# Patient Record
Sex: MALE | Race: WHITE | Employment: FULL TIME | URBAN - METROPOLITAN AREA
[De-identification: names, ages, dates, MRNs, and addresses within clinical notes are randomized per-mention and may not be internally consistent; named-entity substitution may affect disease eponyms.]

---

## 2017-10-25 ENCOUNTER — HOSPITAL ENCOUNTER (OUTPATIENT)
Dept: PHYSICAL THERAPY | Age: 67
Discharge: HOME | End: 2017-10-25
Payer: COMMERCIAL

## 2017-10-25 PROCEDURE — 97112 NEUROMUSCULAR REEDUCATION: CPT

## 2017-10-25 PROCEDURE — 97163 PT EVAL HIGH COMPLEX 45 MIN: CPT

## 2017-10-25 PROCEDURE — 97530 THERAPEUTIC ACTIVITIES: CPT
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
## 2017-11-02 ENCOUNTER — HOSPITAL ENCOUNTER (OUTPATIENT)
Dept: PHYSICAL THERAPY | Age: 67
Discharge: HOME | End: 2017-11-02
Payer: COMMERCIAL

## 2017-11-02 PROCEDURE — 97112 NEUROMUSCULAR REEDUCATION: CPT

## 2017-11-06 NOTE — PROGRESS NOTES
PT DAILY TREATMENT NOTE     Patient Name: Terence Kim  Date:2017  : 1950  [x]  Patient  Verified  Payor: Osmin Russ / Plan: [facility] [employer] PPO / Product Type: PPO /    In time:515  Out time:555  Total Treatment Time (min): 40  Visit #: 2 of 8    Treatment Area: Vestibular dysfunction [H83.2X9]    SUBJECTIVE  Pain Level (0-10 scale): 0  Any medication changes, allergies to medications, adverse drug reactions, diagnosis change, or new procedure performed?: [x] No    [] Yes (see summary sheet for update)  Subjective functional status/changes:   [] No changes reported  Dizziness of about 3/10.  It fluctuates    OBJECTIVE    Modality rationale: Pain control, muscle relaxation   Min Type Additional Details    [] Estim:  []Unatt       []IFC  []Premod                        []Other:  []w/ice   []w/heat  Position:  Location:    [] Estim: []Att    []TENS instruct  []NMES                    []Other:  []w/US   []w/ice   []w/heat  Position:  Location:    []  Traction: [] Cervical       []Lumbar                       [] Prone          []Supine                       []Intermittent   []Continuous Lbs:  [] before manual  [] after manual    []  Ultrasound: []Continuous   [] Pulsed                           []1MHz   []3MHz W/cm2:  Location:    []  Iontophoresis with dexamethasone         Location: [] Take home patch   [] In clinic    []  Ice     []  heat  []  Ice massage  []  Laser   []  Anodyne Position:  Location:    []  Laser with stim  []  Other:  Position:  Location:    []  Vasopneumatic Device Pressure:       [] lo [] med [] hi   Temperature: [] lo [] med [] hi   [] Skin assessment post-treatment:  []intact []redness- no adverse reaction    []redness  adverse reaction:      min []Eval                  []Re-Eval        min Therapeutic Exercise:  [] See flow sheet :        min Therapeutic Activity:  []  See flow sheet :        40 min Neuromuscular Re-education:  [x]  See flow sheet :Advanced VSE, initiated VVI, balance activities, instructed in slelf mobs for CS in sitting position to reduce stiffness   Rationale: improve balance  to improve the patients ability to return to PLOF     min Manual Therapy:                 With   [] TE   [] TA   [] neuro   [] other: Patient Education: [x] Review HEP    [x] Progressed/Changed HEP based on:   [] positioning   [] body mechanics   [] transfers   [] heat/ice application    [] other:      Other Objective/Functional Measures: Right head turn causing increased dizziness and blurred vision     Pain Level (0-10 scale) post treatment: 0    ASSESSMENT/Changes in Function: good tolerance to TE    Patient will continue to benefit from skilled PT services to assess and modify postural abnormalities and address imbalance/dizziness to attain remaining goals. [x]  See Plan of Care  []  See progress note/recertification  []  See Discharge Summary         Progress towards goals / Updated goals:   As per POC    PLAN  []  Upgrade activities as tolerated     [x]  Continue plan of care  []  Update interventions per flow sheet       []  Discharge due to:_  []  Other:_      Yumi Morfin PT 11/5/2017  8:02 PM    Future Appointments  Date Time Provider Arnold Womack   11/8/2017 8:00 AM MARGO Daily [facility]   11/15/2017 8:00 AM MARGO Daily [facility]   11/22/2017 8:00 AM MARGO Hatch [facility]   11/29/2017 8:00 AM MARGO Daily [facility]

## 2017-11-08 ENCOUNTER — HOSPITAL ENCOUNTER (OUTPATIENT)
Dept: PHYSICAL THERAPY | Age: 67
Discharge: HOME | End: 2017-11-08
Payer: COMMERCIAL

## 2017-11-08 PROCEDURE — 97140 MANUAL THERAPY 1/> REGIONS: CPT

## 2017-11-08 PROCEDURE — 97112 NEUROMUSCULAR REEDUCATION: CPT

## 2017-11-08 NOTE — PROGRESS NOTES
PT DAILY TREATMENT NOTE     Patient Name: Cris Ferreira  Date:2017  : 1950  [x]  Patient  Verified  Payor: Grant Frank / Plan: [facility] [employer] PPO / Product Type: PPO /    In time:810  Out time:9  Total Treatment Time (min): 50  Visit #: 3 of 8    Treatment Area: Vestibular dysfunction [H83.2X9]    SUBJECTIVE  Pain Level (0-10 scale): 0  Any medication changes, allergies to medications, adverse drug reactions, diagnosis change, or new procedure performed?: [x] No    [] Yes (see summary sheet for update)  Subjective functional status/changes:   [] No changes reported  Dizziness of about 0/10. It fluctuates. Doing a little bit better. Mornings are good . I feel stronger. I am good until about mid morning. Less driving helps. That neck exercise helped a lot. Started testosterone patch today to improve energy level and cognitive function.  'it helps my hA too'  OBJECTIVE    Modality rationale: Pain control, muscle relaxation   Min Type Additional Details    [] Estim:  []Unatt       []IFC  []Premod                        []Other:  []w/ice   []w/heat  Position:  Location:    [] Estim: []Att    []TENS instruct  []NMES                    []Other:  []w/US   []w/ice   []w/heat  Position:  Location:    []  Traction: [] Cervical       []Lumbar                       [] Prone          []Supine                       []Intermittent   []Continuous Lbs:  [] before manual  [] after manual    []  Ultrasound: []Continuous   [] Pulsed                           []1MHz   []3MHz W/cm2:  Location:    []  Iontophoresis with dexamethasone         Location: [] Take home patch   [] In clinic   10 []  Ice     [x]  heat  []  Ice massage  []  Laser   []  Anodyne Position:supine  Location:CS    []  Laser with stim  []  Other:  Position:  Location:    []  Vasopneumatic Device Pressure:       [] lo [] med [] hi   Temperature: [] lo [] med [] hi   [] Skin assessment post-treatment:  []intact []redness- no adverse reaction    []redness  adverse reaction:      min []Eval                  []Re-Eval        min Therapeutic Exercise:  [] See flow sheet :        min Therapeutic Activity:  []  See flow sheet :        30 min Neuromuscular Re-education:  [x]  See flow sheet :Advanced VSE, initiated VVI, balance activities, initiated [place] ex section A   Rationale: improve balance  to improve the patients ability to return to PLOF    10 min Manual Therapy:  Functional massage CS in supine               With   [] TE   [] TA   [] neuro   [] other: Patient Education: [x] Review HEP    [x] Progressed/Changed HEP based on:   [] positioning   [] body mechanics   [] transfers   [] heat/ice application    [] other:      Other Objective/Functional Measures:   overall reduction in symptoms  Occasional LOB during VSE with MSR position, able to catch himself, mild increase in dizziness, challenged, labored breathing  Needs guidance with coordination of VVI ex  Less CS stiffness and HA  Feeling of unsteadiness after quick head movements     Pain Level (0-10 scale) post treatment: 0    ASSESSMENT/Changes in Function: good tolerance to TE    Patient will continue to benefit from skilled PT services to assess and modify postural abnormalities and address imbalance/dizziness to attain remaining goals. [x]  See Plan of Care  []  See progress note/recertification  []  See Discharge Summary         Progress towards goals / Updated goals:  Short Term Goals: To be accomplished in 4 weeks:                           1. Patient willing to perform HEP 3x daily to assure progress in ambulatory balance and dizziness  Status at Eval: patient eduction initiated  Current status: patient compliant with TID HEP, progressing     2.  Patient reports reduction in balance and dizziness with ADL by >or= 25% for increased tolerance to ADL  Status at [facility]: dizziness and imbalance with ADL, marked fatigue, difficulty with prolonged driving/computer work     Long Term Goals: To be accomplished in 8 weeks:                           1. Improved FOTO score to >or= 74/100 as evidence of improved functional tolerances with ADL, prolonged driving and computer work  Status at [facility]: [address] [facility]     2. Reduction in dizziness with ADL as evidence by improved DHI score to <or=20 points  Status at [facility]: [facility] [address] 34 points     3. Improved FGA to >or= 28/30 points as evidence of improved ambulatory balance  Status at [facility]: FGA 26/30     4.  Patient is willing to continue with vestibular ex and has good understanding of tapering upon discharge to assure continued progress  Status at [facility]: HEP initiated    PLAN  []  Upgrade activities as tolerated     [x]  Continue plan of care  []  Update interventions per flow sheet       []  Discharge due to:_  []  Other:_      Rosio Cardoso PT 11/8/2017  8:02 PM    Future Appointments  Date Time Provider Arnold Womack   11/15/2017 8:00 AM Jeaneth caldera, MARGO JASSO [facility]   11/22/2017 8:00 AM MARGO Daily [facility]   11/29/2017 8:00 AM Jeaneth caldera, MARGO JASSO [facility]

## 2017-11-15 ENCOUNTER — HOSPITAL ENCOUNTER (OUTPATIENT)
Dept: PHYSICAL THERAPY | Age: 67
Discharge: HOME | End: 2017-11-15
Payer: COMMERCIAL

## 2017-11-15 PROCEDURE — 97140 MANUAL THERAPY 1/> REGIONS: CPT

## 2017-11-15 PROCEDURE — 97112 NEUROMUSCULAR REEDUCATION: CPT

## 2017-11-15 NOTE — PROGRESS NOTES
PT DAILY TREATMENT NOTE     Patient Name: Marivel Reynolds  Date:11/15/2017  : 1950  [x]  Patient  Verified  Payor: Melany Estimable / Plan: [facility] [employer] PPO / Product Type: PPO /    In time:805  Out time:9  Total Treatment Time (min): 55  Visit #: 4 of 8    Treatment Area: Vestibular dysfunction [H83.2X9]    SUBJECTIVE  Pain Level (0-10 scale): 0/10  Any medication changes, allergies to medications, adverse drug reactions, diagnosis change, or new procedure performed?: [x] No    [] Yes (see summary sheet for update)  Subjective functional status/changes:   [] No changes reported  I am feeling a little better  OBJECTIVE    Modality rationale: Pain control, muscle relaxation   Min Type Additional Details    [] Estim:  []Unatt       []IFC  []Premod                        []Other:  []w/ice   []w/heat  Position:  Location:    [] Estim: []Att    []TENS instruct  []NMES                    []Other:  []w/US   []w/ice   []w/heat  Position:  Location:    []  Traction: [] Cervical       []Lumbar                       [] Prone          []Supine                       []Intermittent   []Continuous Lbs:  [] before manual  [] after manual    []  Ultrasound: []Continuous   [] Pulsed                           []1MHz   []3MHz W/cm2:  Location:    []  Iontophoresis with dexamethasone         Location: [] Take home patch   [] In clinic   10 []  Ice     [x]  heat  []  Ice massage  []  Laser   []  Anodyne Position:supine  Location:CS    []  Laser with stim  []  Other:  Position:  Location:    []  Vasopneumatic Device Pressure:       [] lo [] med [] hi   Temperature: [] lo [] med [] hi   [] Skin assessment post-treatment:  []intact []redness- no adverse reaction    []redness  adverse reaction:      min []Eval                  []Re-Eval        min Therapeutic Exercise:  [] See flow sheet :        min Therapeutic Activity:  []  See flow sheet :        35 min Neuromuscular Re-education:  [x]  See flow sheet :Advanced VSE and VVI, balance activities, Cawthorne ex section B/A   Rationale: improve balance  to improve the patients ability to return to PLOF    10 min Manual Therapy:  Functional massage CS in supine               With   [] TE   [] TA   [] neuro   [] other: Patient Education: [x] Review HEP    [x] Progressed/Changed HEP based on:   [] positioning   [] body mechanics   [] transfers   [] heat/ice application    [] other:      Other Objective/Functional Measures:   Challenged with SR position yuliana during VSE  Needs verbal cues for proper performance with VSE/VVI ex  Mild increase in dizziness, occasional LOB     Pain Level (0-10 scale) post treatment: 0    ASSESSMENT/Changes in Function: good tolerance to TE    Patient will continue to benefit from skilled PT services to assess and modify postural abnormalities and address imbalance/dizziness to attain remaining goals. [x]  See Plan of Care  []  See progress note/recertification  []  See Discharge Summary         Progress towards goals / Updated goals:  Short Term Goals: To be accomplished in 4 weeks:                           1. Patient willing to perform HEP 3x daily to assure progress in ambulatory balance and dizziness  Status at [facility]: patient eduction initiated  Current status: patient compliant with TID HEP, progressing     2. Patient reports reduction in balance and dizziness with ADL by >or= 25% for increased tolerance to ADL  Status at [facility]: dizziness and imbalance with ADL, marked fatigue, difficulty with prolonged driving/computer work  Current status:      Long Term Goals: To be accomplished in 8 weeks:                           1. Improved FOTO score to >or= 74/100 as evidence of improved functional tolerances with ADL, prolonged driving and computer work  Status at [facility]: [address] [place] [address]     2. Reduction in dizziness with ADL as evidence by improved DHI score to <or=20 points  Status at [facility]: [address] [place] [address] 34 points     3.  Improved FGA to >or= 28/30 points as evidence of improved ambulatory balance  Status at [facility]: FGA 26/30     4.  Patient is willing to continue with vestibular ex and has good understanding of tapering upon discharge to assure continued progress  Status at [facility]: HEP initiated    PLAN  []  Upgrade activities as tolerated     [x]  Continue plan of care  []  Update interventions per flow sheet       []  Discharge due to:_  []  Other:_      João Singh, MARGO 11/15/2017  8:02 PM    Future Appointments  Date Time Provider Arnold Womack   11/22/2017 8:00 AM Jeaneth caldera, PT [facility]   11/29/2017 8:00 AM Jeaneth caldera, [facility]GO [facility]

## 2017-11-22 ENCOUNTER — HOSPITAL ENCOUNTER (OUTPATIENT)
Dept: PHYSICAL THERAPY | Age: 67
Discharge: HOME | End: 2017-11-22
Payer: COMMERCIAL

## 2017-11-22 PROCEDURE — 97112 NEUROMUSCULAR REEDUCATION: CPT

## 2017-11-22 PROCEDURE — 97140 MANUAL THERAPY 1/> REGIONS: CPT

## 2017-11-22 NOTE — PROGRESS NOTES
PT DAILY TREATMENT NOTE     Patient Name: Hussain Curry  Date:2017  : 1950  [x]  Patient  Verified  Payor: Marcela Ragsdale / Plan: [facility] [employer] PPO / Product Type: PPO /    In time:805  Out time:855  Total Treatment Time (min):50   Visit #: 5 of 8    Treatment Area: Vestibular dysfunction [H83.2X9]    SUBJECTIVE  Pain Level (0-10 scale): 0  Any medication changes, allergies to medications, adverse drug reactions, diagnosis change, or new procedure performed?: [x] No    [] Yes (see summary sheet for update)  Subjective functional status/changes:   [] No changes reported  Reports increased ease with driving and head turns, no dizziness or HA this AM, less neck pain, overall improvement about 50%    Modality rationale: Pain control, muscle relaxation   Min Type Additional Details    [] Estim:  []Unatt       []IFC  []Premod                        []Other:  []w/ice   []w/heat  Position:  Location:    [] Estim: []Att    []TENS instruct  []NMES                    []Other:  []w/US   []w/ice   []w/heat  Position:  Location:    []  Traction: [] Cervical       []Lumbar                       [] Prone          []Supine                       []Intermittent   []Continuous Lbs:  [] before manual  [] after manual    []  Ultrasound: []Continuous   [] Pulsed                           []1MHz   []3MHz W/cm2:  Location:    []  Iontophoresis with dexamethasone         Location: [] Take home patch   [] In clinic   10 []  Ice     [x]  heat  []  Ice massage  []  Laser   []  Anodyne Position:supine  Location:CS    []  Laser with stim  []  Other:  Position:  Location:    []  Vasopneumatic Device Pressure:       [] lo [] med [] hi   Temperature: [] lo [] med [] hi   [] Skin assessment post-treatment:  []intact []redness- no adverse reaction    []redness  adverse reaction:      min []Eval                  []Re-Eval        min Therapeutic Exercise:  [] See flow sheet :        min Therapeutic Activity:  []  See flow sheet :        30 min Neuromuscular Re-education:  [x]  See flow sheet :Advanced VSE, initiated VVI, balance activities, initiated Cawthorne ex section c   Rationale: improve balance  to improve the patients ability to return to PLOF    10 min Manual Therapy:  Functional massage CS in supine               With   [] TE   [] TA   [] neuro   [] other: Patient Education: [x] Review HEP    [x] Progressed/Changed HEP based on:   [] positioning   [] body mechanics   [] transfers   [] heat/ice application    [] other:      Other Objective/Functional Measures:   overall reduction in symptoms by 50%  Occasional LOB during VSE with MSR position, able to catch himself, challenged with SR/needs to offset minimally  Difficulty maintaining gaze stability with SR stance, doing better with change in foot position       Pain Level (0-10 scale) post treatment: 0    ASSESSMENT/Changes in Function: good tolerance to TE    Patient will continue to benefit from skilled PT services to assess and modify postural abnormalities and address imbalance/dizziness to attain remaining goals. [x]  See Plan of Care  [x]  See progress note/recertification  []  See Discharge Summary         Progress towards goals / Updated goals:Mr. Coleen Becerril has been showing nice improvement in functional tolerances (FOTO 73/100), reports of dizziness (50% reduction) , ambulatory balance and functional CS mobility. He has met 2 of 6 goals. I recommend continuation of current treatment to address remaining goals. Short Term Goals: To be accomplished in 4 weeks:                           1. Patient willing to perform HEP 3x daily to assure progress in ambulatory balance and dizziness  Status at [facility]: patient eduction initiated  Current status: patient compliant with TID HEP, excellent motivation, goal met     2.  Patient reports reduction in balance and dizziness with ADL by >or= 25% for increased tolerance to ADL  Status at [facility]: dizziness and imbalance with ADL, marked fatigue, difficulty with prolonged driving/computer work  Current status: reduction in fatigue and dizziness by 50%, goal met     Long Term Goals: To be accomplished in 8 weeks:                           1. Improved FOTO score to >or= 74/100 as evidence of improved functional tolerances with ADL, prolonged driving and computer work  Status at [facility]: FOTO 63/100  Current status: FOTO 73/100, goal almost met     2. Reduction in dizziness with ADL as evidence by improved DHI score to <or=20 points  Status at [facility]: [address] [place] [address] 34 points  Current status: DHI 48, score regressed, score doesn't match subjective and demonstrated improvement, will be retested     3. Improved FGA to >or= 28/30 points as evidence of improved ambulatory balance  Status at [facility]: FGA 26/30  Current status: not retested     4.  Patient is willing to continue with vestibular ex and has good understanding of tapering upon discharge to assure continued progress  Status at [facility]: HEP initiated    PLAN  []  Upgrade activities as tolerated     [x]  Continue plan of care  []  Update interventions per flow sheet       []  Discharge due to:_  []  Other:_      Gianfranco Stanley, PT 11/22/2017  8:02 PM    Future Appointments  Date Time Provider Arnold Womack   11/29/2017 8:00 AM Jeaneth caldera, PT [facility]

## 2017-11-28 NOTE — PROGRESS NOTES
[facility] in [address] [place], [address] [place]  Phone: [phone]      Fax:  [phone]    Progress Note  Patient name: Vinicio Robins Start of Care: 10/25/2017   Referral source: Kentrell Taylor MD : 1950                            Medical Diagnosis: Vestibular dysfunction [H83.2X9] Onset XLSE:[id number]                            Treatment Diagnosis: Dizziness, imbalance   Prior Hospitalization: see medical history Provider#: [id number]   Medications: Verified on Patient summary List    Comorbidities: depression, high BP   Prior Level of Function: Full function      Visits from Start of Care: 5    Missed Visits: 0        Progress towards goals / Updated goals:Mr. Sarah Lind has been showing nice improvement in functional tolerances (FOTO 73/100), reports of dizziness (50% reduction) , ambulatory balance and functional CS mobility. He has met 2 of 6 goals. I recommend continuation of current treatment to address remaining goals. Short Term Goals: To be accomplished in 4 weeks:                           1. Patient willing to perform HEP 3x daily to assure progress in ambulatory balance and dizziness  Status at [facility]: patient eduction initiated  Current status: patient compliant with TID HEP, excellent motivation, goal met     2. Patient reports reduction in balance and dizziness with ADL by >or= 25% for increased tolerance to ADL  Status at [facility]: dizziness and imbalance with ADL, marked fatigue, difficulty with prolonged driving/computer work  Current status: reduction in fatigue and dizziness by 50%, goal met     Long Term Goals:  To be accomplished in 8 weeks:                           1. Improved FOTO score to >or= 74/100 as evidence of improved functional tolerances with ADL, prolonged driving and computer work  Status at [facility]: FOTO 63/100  Current status: FOTO 73/100, goal almost met     2. Reduction in dizziness with ADL as evidence by improved DHI score to <or=20 points  Status at [facility]: [facility] [address] 34 points  Current status: DHI 48, score regressed, score doesn't match subjective and demonstrated improvement, will be retested     3. Improved FGA to >or= 28/30 points as evidence of improved ambulatory balance  Status at [facility]: FGA 26/30  Current status: not retested     4. Patient is willing to continue with vestibular ex and has good understanding of tapering upon discharge to assure continued progress  Status at [facility]: HEP initiated  Key Functional Changes: Tolerance to ADL, dizziness    ASSESSMENT/RECOMMENDATIONS:  [x]Continue therapy per initial plan/protocol at a frequency of  1 x per week for 8 weeks ( 3 remaining visits)  []Continue therapy with the following recommended changes:_____________________      _____________________________________________________________________  []Discontinue therapy progressing towards or have reached established goals  []Discontinue therapy due to lack of appreciable progress towards goals  []Discontinue therapy due to lack of attendance or compliance  []Await Physician's recommendations/decisions regarding therapy  []Other:________________________________________________________________    Thank you for this referral.   Linsey Elise, PT 11/27/2017 10:32 PM  NOTE TO PHYSICIAN:  PLEASE COMPLETE THE ORDERS BELOW AND   FAX TO [facility]: [phone]  If you are unable to process this request in 24 hours please contact our office:   [phone]  []  I have read the above report and request that my patient continue as recommended. []  I have read the above report and request that my patient continue therapy with the following changes/special instructions:________________________________________  []I have read the above report and request that my patient be discharged from therapy.     Physicians signature: ______________________________Date: ______Time:______

## 2017-11-29 ENCOUNTER — HOSPITAL ENCOUNTER (OUTPATIENT)
Dept: PHYSICAL THERAPY | Age: 67
Discharge: HOME | End: 2017-11-29
Payer: COMMERCIAL

## 2017-11-29 PROCEDURE — 97110 THERAPEUTIC EXERCISES: CPT

## 2017-11-29 PROCEDURE — 97112 NEUROMUSCULAR REEDUCATION: CPT

## 2017-11-29 NOTE — PROGRESS NOTES
PT DAILY TREATMENT NOTE     Patient Name: Jacque Garcia  Date:2017  : 1950  [x]  Patient  Verified  Payor: Devonte Espino / Plan: [facility] [employer] PPO / Product Type: PPO /    In time:805  Out time:855  Total Treatment Time (min):50   Visit #: 6 of 8    Treatment Area: Vestibular dysfunction [H83.2X9]    SUBJECTIVE  Pain Level (0-10 scale): 0, dizziness 1-2  Any medication changes, allergies to medications, adverse drug reactions, diagnosis change, or new procedure performed?: [x] No    [] Yes (see summary sheet for update)  Subjective functional status/changes:   [] No changes reported  Reports increased ease with driving and head turns, no dizziness or HA this AM, less neck pain, overall improvement about 50%  Overall feeling better , increased attention span, have not had any falls or LOB over the last 3 weeks. 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 It is most difficulty if I have to focus on something while there is a lot of noise and I have to balance/walk /negotiate obstacles.   It seems like my dizziness level is related to my BP    Modality rationale: Pain control, muscle relaxation   Min Type Additional Details    [] Estim:  []Unatt       []IFC  []Premod                        []Other:  []w/ice   []w/heat  Position:  Location:    [] Estim: []Att    []TENS instruct  []NMES                    []Other:  []w/US   []w/ice   []w/heat  Position:  Location:    []  Traction: [] Cervical       []Lumbar                       [] Prone          []Supine                       []Intermittent   []Continuous Lbs:  [] before manual  [] after manual    []  Ultrasound: []Continuous   [] Pulsed                           []1MHz   []3MHz W/cm2:  Location:    []  Iontophoresis with dexamethasone         Location: [] Take home patch   [] In clinic   10 []  Ice     [x]  heat  []  Ice massage  []  Laser   []  Anodyne Position:supine  Location: []  Laser with stim  []  Other:  Position:  Location:    []  Vasopneumatic Device Pressure:       [] lo [] med [] hi   Temperature: [] lo [] med [] hi   [] Skin assessment post-treatment:  []intact []redness- no adverse reaction    []redness  adverse reaction:      min []Eval                  []Re-Eval       10 min Therapeutic Exercise:  [x] See flow sheet :CS ROM and muscle release with NH in supine        min Therapeutic Activity:  []  See flow sheet :        30 min Neuromuscular Re-education:  [x]  See flow sheet :Advanced VSE, initiated VVI, balance activities, initiated Cawthorne ex section c   Rationale: improve balance  to improve the patients ability to return to PLOF     min Manual Therapy:                 With   [] TE   [] TA   [] neuro   [] other: Patient Education: [x] Review HEP    [x] Progressed/Changed HEP based on:   [] positioning   [] body mechanics   [] transfers   [] heat/ice application    [] other:      Other Objective/Functional Measures:   overall reduction in symptoms by 50%  Increased sway with horizontal head turns  Difficulty with tandem gait without looking down  Challenged with Bosu, needs external assist to maintain stance  Increased dizziness to 3/10 post vestibular/gait activities  Mild CS pain upper CS with left rotation  Instructed in self massage/mobs       Pain Level (0-10 scale) post treatment: 0    ASSESSMENT/Changes in Function: good tolerance to TE    Patient will continue to benefit from skilled PT services to assess and modify postural abnormalities and address imbalance/dizziness to attain remaining goals. [x]  See Plan of Care  [x]  See progress note/recertification  []  See Discharge Summary         Progress towards goals / Updated goals:Mr. Олег Becerra has been showing nice improvement in functional tolerances (FOTO 73/100), reports of dizziness (50% reduction) , ambulatory balance and functional CS mobility. He has met 2 of 6 goals.  I recommend continuation of current treatment to address remaining goals. Short Term Goals: To be accomplished in 4 weeks:                           1. Patient willing to perform HEP 3x daily to assure progress in ambulatory balance and dizziness  Status at [facility]: patient eduction initiated  Current status: patient compliant with TID HEP, excellent motivation, goal met     2. Patient reports reduction in balance and dizziness with ADL by >or= 25% for increased tolerance to ADL  Status at [facility]: dizziness and imbalance with ADL, marked fatigue, difficulty with prolonged driving/computer work  Current status: reduction in fatigue and dizziness by 50%, goal met     Long Term Goals: To be accomplished in 8 weeks:                           1. Improved FOTO score to >or= 74/100 as evidence of improved functional tolerances with ADL, prolonged driving and computer work  Status at [facility]: FOTO 63/100  Current status: FOTO 73/100, goal almost met     2. Reduction in dizziness with ADL as evidence by improved DHI score to <or=20 points  Status at [facility]: [address] [place] [address] 34 points  Current status: DHI 48, score regressed, score doesn't match subjective and demonstrated improvement, will be retested     3. Improved FGA to >or= 28/30 points as evidence of improved ambulatory balance  Status at [facility]: FGA 26/30  Current status: not retested     4. Patient is willing to continue with vestibular ex and has good understanding of tapering upon discharge to assure continued progress  Status at [facility]: HEP initiated    PLAN  []  Upgrade activities as tolerated     [x]  Continue plan of care  []  Update interventions per flow sheet       []  Discharge due to:_  []  Other:_      Oralia Murphy, PT 11/29/2017  8:02 PM    No future appointments.

## 2017-12-07 ENCOUNTER — HOSPITAL ENCOUNTER (OUTPATIENT)
Dept: PHYSICAL THERAPY | Age: 67
Discharge: HOME | End: 2017-12-07
Payer: MEDICARE

## 2017-12-07 PROCEDURE — 97110 THERAPEUTIC EXERCISES: CPT

## 2017-12-07 PROCEDURE — 97140 MANUAL THERAPY 1/> REGIONS: CPT

## 2017-12-07 PROCEDURE — 97112 NEUROMUSCULAR REEDUCATION: CPT

## 2017-12-07 NOTE — PROGRESS NOTES
PT DAILY TREATMENT NOTE     Patient Name: Angela Richards  Date:2017  : 1950  [x]  Patient  Verified  Payor: [place] MEDICARE / Plan: [place] MEDICARE PART A & B / Product Type: Medicare /    In time:438  Out time:525  Total Treatment Time (min):47   Visit #: 7 of 8    Treatment Area: Vestibular dysfunction [H83.2X9]    SUBJECTIVE  Pain Level (0-10 scale): 2/10 CS pain  Any medication changes, allergies to medications, adverse drug reactions, diagnosis change, or new procedure performed?: [x] No    [] Yes (see summary sheet for update)  Subjective functional status/changes:   [] No changes reported  I have been feeling pretty good even that I worked all day    Modality rationale: Pain control, muscle relaxation   Min Type Additional Details    [] Estim:  []Unatt       []IFC  []Premod                        []Other:  []w/ice   []w/heat  Position:  Location:    [] Estim: []Att    []TENS instruct  []NMES                    []Other:  []w/US   []w/ice   []w/heat  Position:  Location:    []  Traction: [] Cervical       []Lumbar                       [] Prone          []Supine                       []Intermittent   []Continuous Lbs:  [] before manual  [] after manual    []  Ultrasound: []Continuous   [] Pulsed                           []1MHz   []3MHz W/cm2:  Location:    []  Iontophoresis with dexamethasone         Location: [] Take home patch   [] In clinic   10 []  Ice     [x]  heat  []  Ice massage  []  Laser   []  Anodyne Position:supine  Location:CS    []  Laser with stim  []  Other:  Position:  Location:    []  Vasopneumatic Device Pressure:       [] lo [] med [] hi   Temperature: [] lo [] med [] hi   [] Skin assessment post-treatment:  []intact []redness- no adverse reaction    []redness  adverse reaction:      min []Eval                  []Re-Eval       12 min Therapeutic Exercise:  [x] See flow sheet :CS ROM and muscle release with TN in supine        min Therapeutic Activity:  []  See flow sheet : 15 min Neuromuscular Re-education:  [x]  See flow sheet :Advanced VSE, initiated VVI, balance activities, [place] Ex   Rationale: improve balance  to improve the patients ability to return to PLOF    10 min Manual Therapy: functional massage CS                With   [] TE   [] TA   [] neuro   [] other: Patient Education: [x] Review HEP    [x] Progressed/Changed HEP based on:   [] positioning   [] body mechanics   [] transfers   [] heat/ice application    [] other:      Other Objective/Functional Measures:   overall reduction in symptoms by 40-50%  Difficulty and occasional LOB with VSE in SR, standing on airex mat  Mild left suboccipital pain with CS Rot to left         Pain Level (0-10 scale) post treatment: 0    ASSESSMENT/Changes in Function: good tolerance to TE    Patient will continue to benefit from skilled PT services to assess and modify postural abnormalities and address imbalance/dizziness to attain remaining goals. [x]  See Plan of Care  [x]  See progress note/recertification  []  See Discharge Summary         Progress towards goals / Updated goals:Mr. Coleen Becerril has been showing nice improvement in functional tolerances (FOTO 73/100), reports of dizziness (50% reduction) , ambulatory balance and functional CS mobility. He has met 2 of 6 goals. I recommend continuation of current treatment to address remaining goals. Short Term Goals: To be accomplished in 4 weeks:                           1. Patient willing to perform HEP 3x daily to assure progress in ambulatory balance and dizziness  Status at Eval: patient eduction initiated  Current status: patient compliant with TID HEP, excellent motivation, goal met     2.  Patient reports reduction in balance and dizziness with ADL by >or= 25% for increased tolerance to ADL  Status at HEP: dizziness and imbalance with ADL, marked fatigue, difficulty with prolonged driving/computer work  Current status: reduction in fatigue and dizziness by 50%, goal met     Long Term Goals: To be accomplished in 8 weeks:                           1. Improved FOTO score to >or= 74/100 as evidence of improved functional tolerances with ADL, prolonged driving and computer work  Status at [facility]: FOTO 63/100  Current status: FOTO 73/100, goal almost met     2. Reduction in dizziness with ADL as evidence by improved DHI score to <or=20 points  Status at [facility]: [address] [place] [address] 34 points  Current status: DHI 48, score regressed, score doesn't match subjective and demonstrated improvement, will be retested     3. Improved FGA to >or= 28/30 points as evidence of improved ambulatory balance  Status at [facility]: FGA 26/30  Current status: not retested     4.  Patient is willing to continue with vestibular ex and has good understanding of tapering upon discharge to assure continued progress  Status at [facility]: HEP initiated    PLAN  []  Upgrade activities as tolerated     [x]  Continue plan of care, reevaluation NV  []  Update interventions per flow sheet       []  Discharge due to:_  []  Other:_      Darinel Mike PT 12/7/2017  8:02 PM    Future Appointments  Date Time Provider Arnold Womack   12/14/2017 4:30 PM Darinel Mike, PT [facility]PTD [facility]

## 2017-12-13 ENCOUNTER — APPOINTMENT (OUTPATIENT)
Dept: PHYSICAL THERAPY | Age: 67
End: 2017-12-13
Payer: MEDICARE

## 2017-12-14 ENCOUNTER — HOSPITAL ENCOUNTER (OUTPATIENT)
Dept: PHYSICAL THERAPY | Age: 67
Discharge: HOME | End: 2017-12-14
Payer: MEDICARE

## 2017-12-14 PROCEDURE — 97112 NEUROMUSCULAR REEDUCATION: CPT

## 2017-12-14 PROCEDURE — 97530 THERAPEUTIC ACTIVITIES: CPT

## 2017-12-14 NOTE — PROGRESS NOTES
PT DAILY TREATMENT NOTE     Patient Name: Juanita Milner  Date:2017  : 1950  [x]  Patient  Verified  Payor: Rajesh Betancourt / Plan: [place] MEDICARE PART A & B / Product Type: Medicare /    In time:440  Out time:520  Total Treatment Time (min):40   Visit #: 8 of 8    Treatment Area: Vestibular dysfunction [H83.2X9]    SUBJECTIVE  Pain Level (0-10 scale): 0/10 CS pain  Any medication changes, allergies to medications, adverse drug reactions, diagnosis change, or new procedure performed?: [x] No    [] Yes (see summary sheet for update)  Subjective functional status/changes:   [] No changes reported  I saw the dentist this morning and that set me off for an hour but overall I am feeling much better  Overall improvement of about 50%  Residual mild fatigue with prolonged driving and computer work         min []Eval                  []Re-Eval           25 min Therapeutic Activity:  [x]  See flow sheet :reevaluation of current status, balance activities, advanced HEP        15 min Neuromuscular Re-education:  [x]  See flow sheet :instruction in advanced VSE/VVI and tapering after d/c   Rationale: improve balance  to improve the patients ability to return to PLOF                With   [] TE   [] TA   [] neuro   [] other: Patient Education: [x] Review HEP    [x] Progressed/Changed HEP based on:   [] positioning   [] body mechanics   [] transfers   [] heat/ice application    [] other:      Other Objective/Functional Measures:   overall reduction in symptoms by 50%  FOTO regressed to 66/100, patient has answered lots of the questions more related to his residual cognitive issues  DHI regressed to 60 for same reason as above  Good understanding of HEP and tapering  Will continue with Neurooptometrist  Stacy hallpike test negative           Pain Level (0-10 scale) post treatment: 0      [x]  See Discharge Summary         Progress towards goals / Updated goals:Mr. Syl Valladares has been showing nice improvement in functional tolerances , reports of dizziness (50% reduction) , ambulatory balance (FGA 30/30) and functional CS mobility. He has met 4 of 6 goals. Unmet goals are related to fluctuation in FOTO /DHI scores. I recommend discharge to HEP. Short Term Goals: To be accomplished in 4 weeks:                           1. Patient willing to perform HEP 3x daily to assure progress in ambulatory balance and dizziness  Status at [facility]: patient eduction initiated  Current status: patient compliant with TID HEP, excellent motivation, goal met     2. Patient reports reduction in balance and dizziness with ADL by >or= 25% for increased tolerance to ADL  Status at [facility]: dizziness and imbalance with ADL, marked fatigue, difficulty with prolonged driving/computer work  Current status: reduction in fatigue and dizziness by 50%, goal met     Long Term Goals: To be accomplished in 8 weeks:                           1. Improved FOTO score to >or= 74/100 as evidence of improved functional tolerances with ADL, prolonged driving and computer work  Status at [facility]: FOTO 63/100  Current status: FOTO 73/100, goal almost met  Status at [facility]:66/100, slight regression since last tested     2. Reduction in dizziness with ADL as evidence by improved DHI score to <or=20 points  Status at [facility]: [address] [place] [address] 34 points  Current status: DHI 48, score regressed, score doesn't match subjective and demonstrated improvement, will be retested  Status at /: further regression to 66/100 due to patient relating most questions to cognitive deficits and vision rather than dizziness, overall dizziness reduced by 50% subjectively, goal not met     3. Improved FGA to >or= 28/30 points as evidence of improved ambulatory balance  Status at [facility]: FGA 26/30  Current status:FGA 30/30, goal met     4.  Patient is willing to continue with vestibular ex and has good understanding of tapering upon discharge to assure continued progress  Status at [facility]: HEP initiated  Status at /: goal met    PLAN       [x]  Discharge due to:_  []  Other:_      Dane Romero, PT 12/14/2017  8:02 PM    No future appointments.

## 2017-12-15 NOTE — PROGRESS NOTES
[facility] in [address] [place], [address] [place]  Phone: [phone]      Fax:  [phone]    Discharge Summary      Patient name: Cruz Salinas Start of Care: 10/25/2017   Referral source: Melia Stover MD : 1950                            Medical Diagnosis: Vestibular dysfunction [H83.2X9] Onset Date:                            Treatment Diagnosis: Dizziness, imbalance   Prior Hospitalization: see medical history Provider#: [id number]   Medications: Verified on Patient summary List    Comorbidities: depression, high BP   Prior Level of Function: Full function      Visits from Start of Care: 8    Missed Visits: 0  Reporting Period : 10/25/17 to 17      Progress towards goals / Updated goals:Mr. Gilberto Mares has been showing nice improvement in functional tolerances , reports of dizziness (50% reduction) , ambulatory balance (FGA 30/30) and functional CS mobility. He has met 4 of 6 goals. Unmet goals are related to fluctuation in FOTO /DHI scores. I recommend discharge to [facility]. Short Term Goals: To be accomplished in 4 weeks:                           1. Patient willing to perform HEP 3x daily to assure progress in ambulatory balance and dizziness  Status at [facility]: patient eduction initiated  Current status: patient compliant with TID HEP, excellent motivation, goal met     2. Patient reports reduction in balance and dizziness with ADL by >or= 25% for increased tolerance to ADL  Status at [facility]: dizziness and imbalance with ADL, marked fatigue, difficulty with prolonged driving/computer work  Current status: reduction in fatigue and dizziness by 50%, goal met     Long Term Goals:  To be accomplished in 8 weeks:                           1. Improved FOTO score to >or= 74/100 as evidence of improved functional tolerances with ADL, prolonged driving and computer work  Status at [facility]: FOTO 63/100  Current status: FOTO 73/100, goal almost met  Status at D/C:66/100, slight regression since last tested     2. Reduction in dizziness with ADL as evidence by improved DHI score to <or=20 points  Status at [facility]: [address] 34 points  Current status: DHI 48, score regressed, score doesn't match subjective and demonstrated improvement, will be retested  Status at D/C: further regression to 66/100 due to patient relating most questions to cognitive deficits and vision rather than dizziness, overall dizziness reduced by 50% subjectively, goal not met     3. Improved FGA to >or= 28/30 points as evidence of improved ambulatory balance  Status at [facility]: FGA 26/30  Current status:FGA 30/30, goal met     4. Patient is willing to continue with vestibular ex and has good understanding of tapering upon discharge to assure continued progress  Status at [facility]: HEP initiated  Status at D/C: goal met    G-Codes (GP)  Mobility    Goal  CJ= 20-39%  D/C  CJ= 20-39%      The severity rating is based on clinical judgment and the FOTO score. Assessment/ Summary of Care: Excellent progress.  Independent with progression and tapering of vestibular ex    RECOMMENDATIONS:  [x]Discontinue therapy: [x]Patient has reached or is progressing toward set goals      []Patient is non-compliant or has abdicated      []Due to lack of appreciable progress towards set goals    Darinel Mike, PT 12/15/2017 8:35 AM

## 2023-02-15 ENCOUNTER — HOSPITAL ENCOUNTER (OUTPATIENT)
Facility: HOSPITAL | Age: 73
Setting detail: RECURRING SERIES
Discharge: HOME | End: 2023-02-18
Payer: MEDICARE

## 2023-02-15 PROCEDURE — 97110 THERAPEUTIC EXERCISES: CPT

## 2023-02-15 PROCEDURE — 97161 PT EVAL LOW COMPLEX 20 MIN: CPT

## 2023-02-15 NOTE — PROGRESS NOTES
[facility] at [address]  Phone: [phone]   Fax: [phone]    Plan of Care/ Statement of Necessity for Physical Therapy Services      Patient name: Oz Rodriguez Start of Care: 2/15/2023   Referral source: Whitney Balderrama : 1950    Medical Diagnosis: No admission diagnoses are documented for this encounter. 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 Assessment/ key information: Patient is a 80 yo male that presents with c/o low back pain that radiates zaria bilateral LEs for the last 15 years and becoming progressively worse. He has decreased postural awareness. He demonstrates reduced lumbar ROM. He has decreased bilateral LE and abdominal strength. He has reduced balance and requires UEA to perform SLS. Patient ambulates with independence demonstrating a antalgic gait pattern and a WBOS. Patient's demonstrates a flexion based directional preference. Patient presentation is consistent with spinal stenosis accompanied by bilateral LE neuropathy. Patient will benefit from skilled PT services to modify and progress therapeutic interventions, address functional mobility deficits, address ROM deficits, address strength deficits, analyze and address soft tissue restrictions, analyze and cue movement patterns, analyze and modify body mechanics/ergonomics and assess and modify postural abnormalities to attain his goals.     Evaluation Complexity HistoryHIGH Complexity :3+ comorbidities / personal factors will impact the outcome/ POC  ; Examination LOW Complexity : 1-2 Standardized tests and measures addressing body structure, function, activity limitation and / or participation in recreation  ;Presentation LOW Complexity : Stable, uncomplicated  ;Clinical Decision Making MEDIUM Complexity : FOTO score of 26-74 FOTO score = an established functional score where 100 = no disability  Overall Complexity Rating: LOW   Problem List: pain affecting function, decrease ROM, decrease strength, impaired gait/balance, decrease ADL/functional abilities, decrease activity tolerance, decrease flexibility/joint mobility, and decrease transfer abilities    Treatment Plan may include any combination of the followin Therapeutic Exercise, 41684 Neuromuscular Re-Education, 31720 Manual Therapy, 33964 Therapeutic Activity, 30451 Self Care/Home Management, 95590 Aquatic Therapy, and 39694 Gait Training  Patient / Family readiness to learn indicated by: asking questions, trying to perform skills, interest, return verbalization , and return demonstration   Persons(s) to be included in education: patient (P)  Barriers to Learning/Limitations: None  Measures taken if barriers to learning present: NA  Patient Goal (s): To be able to walk and run further  Patient Self Reported Health Status: good  Rehabilitation Potential: good    Short Term Goals: To be accomplished in 4 weeks:   Patient will report compliance with HEP 1x/day to aid in rehabilitation program.   Status at IE: Provided initial HEP   Current:Same as IE     Long Term Goals: To be accomplished in 8 weeks:   Patient will increase bilateral LE strength to 5/5 MMT throughout to aid in completion of ADLs. Status at IE:4/5   Current:Same as IE     Patient will report pain no greater than 0-2/10 throughout entire day to aid in completion of ADLs.    Status at IE:1-610   Current:Same as IE     Patent will be able to stand/walk for 30 mins tor greater with pain 1-2/10 to be able to ambulate community distances and complete ADLs.   Status at IE:15 minutes   Current:Same as IE     Patient will improve FOTO score to 58 points to demonstrate improvement in functional status. Status at IE:FOTO score = 50 (an established functional score where 100 = no disability)   Current: Same as IE    Frequency / Duration: Patient to be seen 2 times per week for 8 weeks    Patient/ Caregiver education and instruction: Diagnosis, prognosis, self care, activity modification, and exercises     [x]  Plan of care has been reviewed with PTA    Certification Period: 2/15/2023 - 5/16/2023    Austin Wilkins, PT 2/15/2023 3:18 PM  _____________________________________________________________________  I certify that the above Therapy Services are being furnished while the patient is under my care. I agree with the treatment plan and certify that this therapy is necessary.     Physician's Signature:____________Date:_________TIME:________                              Mar Mckeon PA-C    ** Signature, Date and Time must be completed for valid certification **    [facility] at [address]  Phone: [phone]   Fax: [phone]

## 2023-02-17 ENCOUNTER — HOSPITAL ENCOUNTER (OUTPATIENT)
Facility: HOSPITAL | Age: 73
Setting detail: RECURRING SERIES
Discharge: HOME | End: 2023-02-20
Payer: MEDICARE

## 2023-02-17 PROCEDURE — 97110 THERAPEUTIC EXERCISES: CPT

## 2023-02-17 PROCEDURE — 97112 NEUROMUSCULAR REEDUCATION: CPT

## 2023-02-17 PROCEDURE — 97530 THERAPEUTIC ACTIVITIES: CPT

## 2023-02-17 NOTE — PROGRESS NOTES
PHYSICAL / OCCUPATIONAL THERAPY - DAILY TREATMENT NOTE (updated )    Patient Name: Marga Conner    Date: 2023    : 1950  Insurance: Payor: MEDICARE / Plan: MEDICARE PART A AND B / Product Type: *No Product type* /      Patient  verified Yes     Visit #   Current / Total 2 16   Time   In / Out 1300 1338   Pain   In / Out 0 0   Subjective Functional Status/Changes: Patient reports that he received an epidural ejection yesterday and he has no pain today. Changes to:  Meds, Allergies, Med Hx, Sx Hx? If yes, update Summary List no       TREATMENT AREA =  No admission diagnoses are documented for this encounter. OBJECTIVE    Therapeutic Procedures: Tx Min Billable or 1:1 Min (if diff from Tx Min) Procedure, Rationale, Specifics   20  89052 Therapeutic Exercise (timed):  increase ROM, strength, coordination, balance, and proprioception to improve patient's ability to progress to PLOF and address remaining functional goals. (see flow sheet as applicable)     Details if applicable:       10  25619 Therapeutic Activity (timed):  use of dynamic activities replicating functional movements to increase ROM, strength, coordination, balance, and proprioception in order to improve patient's ability to progress to PLOF and address remaining functional goals. (see flow sheet as applicable)     Details if applicable:     8  38779 Neuromuscular Re-Education (timed):  improve balance, coordination, kinesthetic sense, posture, core stability and proprioception to improve patient's ability to develop conscious control of individual muscles and awareness of position of extremities in order to progress to PLOF and address remaining functional goals.  (see flow sheet as applicable)     Details if applicable:     45  [facility] Totals Reminder: bill using total billable min of TIMED therapeutic procedures (example: do not include dry needle or estim unattended, both untimed codes, in totals to left)  8-22 min = 1 unit; 23-37 min = 2 units; 38-52 min = 3 units; 53-67 min = 4 units; 68-82 min = 5 units   Total Total     [x]  Patient Education billed concurrently with other procedures   [x] Review HEP    [] Progressed/Changed HEP, detail:    [] Other detail:       Objective Information/Functional Measures/Assessment    Patient required cues for activity pacing as he has a tendency to rush exercise resulting in poor mechanics. Encouraged patient to utilize parallel bars during balance exercise for safety. Reviewed HEP to promote good carryover at home. Patient will continue to benefit from skilled PT / OT services to modify and progress therapeutic interventions, analyze and address functional mobility deficits, analyze and address ROM deficits, analyze and address strength deficits, analyze and address soft tissue restrictions, analyze and cue for proper movement patterns, analyze and modify for postural abnormalities, analyze and address imbalance/dizziness, and instruct in home and community integration to address functional deficits and attain remaining goals. Progress toward goals / Updated goals:  []  See Progress Note/Recertification    Short Term Goals: To be accomplished in 4 weeks:                 Patient will report compliance with HEP 1x/day to aid in rehabilitation program.                 Status at IE: Provided initial HEP                 Current: In-progres, reviewed HEP, 2/17/2023     Long Term Goals: To be accomplished in 8 weeks:                 Patient will increase bilateral LE strength to 5/5 MMT throughout to aid in completion of ADLs. Status at IE:4/5                 Current:Same as IE                    Patient will report pain no greater than 0-2/10 throughout entire day to aid in completion of ADLs.                  Status at IE:1-6/10                 Current:Same as IE                    Patent will be able to stand/walk for 30 mins tor greater with pain 1-2/10 to be able to ambulate community distances and complete ADLs. Status at IE:15 minutes                 Current:Same as IE                    Patient will improve FOTO score to 58 points to demonstrate improvement in functional status.                  Status at IE:FOTO score = 50 (an established functional score where 100 = no disability)                 Current: Same as IE    PLAN  Yes  Continue plan of care  []  Upgrade activities as tolerated  []  Discharge due to :  []  Other:    Familia Figueredo, PT    2/17/2023    12:39 PM    Future Appointments   Date Time Provider Aliza Lambert   2/17/2023  1:00 PM Theresa Moore, PT MIHPTHILDA [facility]   2/21/2023 11:00 AM Estelle Kinsey, SALTY FOX [facility]   2/23/2023 12:30 PM Nilda Mills THE [facility]

## 2023-05-24 ENCOUNTER — APPOINTMENT (OUTPATIENT)
Facility: HOSPITAL | Age: 73
End: 2023-05-24
Payer: MEDICARE

## 2023-05-24 ENCOUNTER — HOSPITAL ENCOUNTER (EMERGENCY)
Facility: HOSPITAL | Age: 73
Discharge: HOME | End: 2023-05-24
Attending: EMERGENCY MEDICINE
Payer: MEDICARE

## 2023-05-24 VITALS
HEIGHT: 75 IN | HEART RATE: 81 BPM | SYSTOLIC BLOOD PRESSURE: 138 MMHG | TEMPERATURE: 97.1 F | RESPIRATION RATE: 16 BRPM | BODY MASS INDEX: 33.57 KG/M2 | DIASTOLIC BLOOD PRESSURE: 75 MMHG | OXYGEN SATURATION: 94 % | WEIGHT: 270 LBS

## 2023-05-24 DIAGNOSIS — S43.102A AC SEPARATION, TYPE 3, LEFT, INITIAL ENCOUNTER: Primary | ICD-10-CM

## 2023-05-24 DIAGNOSIS — S22.32XA CLOSED FRACTURE OF ONE RIB OF LEFT SIDE, INITIAL ENCOUNTER: ICD-10-CM

## 2023-05-24 LAB
ANION GAP SERPL CALC-SCNC: 7 MMOL/L (ref 3–18)
BASOPHILS # BLD: 0.1 K/UL (ref 0–0.1)
BASOPHILS NFR BLD: 1 % (ref 0–2)
BUN SERPL-MCNC: 17 MG/DL (ref 7–18)
BUN/CREAT SERPL: 21 (ref 12–20)
CALCIUM SERPL-MCNC: 8.8 MG/DL (ref 8.5–10.1)
CHLORIDE SERPL-SCNC: 101 MMOL/L (ref 100–111)
CO2 SERPL-SCNC: 26 MMOL/L (ref 21–32)
CREAT SERPL-MCNC: 0.81 MG/DL (ref 0.6–1.3)
DIFFERENTIAL METHOD BLD: ABNORMAL
EOSINOPHIL # BLD: 0.1 K/UL (ref 0–0.4)
EOSINOPHIL NFR BLD: 1 % (ref 0–5)
ERYTHROCYTE [DISTWIDTH] IN BLOOD BY AUTOMATED COUNT: 12.8 % (ref 11.6–14.5)
GLUCOSE SERPL-MCNC: 101 MG/DL (ref 74–99)
HCT VFR BLD AUTO: 35.3 % (ref 36–48)
HGB BLD-MCNC: 12 G/DL (ref 13–16)
IMM GRANULOCYTES # BLD AUTO: 0.1 K/UL (ref 0–0.04)
IMM GRANULOCYTES NFR BLD AUTO: 1 % (ref 0–0.5)
LYMPHOCYTES # BLD: 1.5 K/UL (ref 0.9–3.6)
LYMPHOCYTES NFR BLD: 15 % (ref 21–52)
MCH RBC QN AUTO: 31.6 PG (ref 24–34)
MCHC RBC AUTO-ENTMCNC: 34 G/DL (ref 31–37)
MCV RBC AUTO: 92.9 FL (ref 78–100)
MONOCYTES # BLD: 1 K/UL (ref 0.05–1.2)
MONOCYTES NFR BLD: 11 % (ref 3–10)
NEUTS SEG # BLD: 6.9 K/UL (ref 1.8–8)
NEUTS SEG NFR BLD: 72 % (ref 40–73)
NRBC # BLD: 0 K/UL (ref 0–0.01)
NRBC BLD-RTO: 0 PER 100 WBC
PLATELET # BLD AUTO: 227 K/UL (ref 135–420)
PMV BLD AUTO: 10.2 FL (ref 9.2–11.8)
POTASSIUM SERPL-SCNC: 4.2 MMOL/L (ref 3.5–5.5)
RBC # BLD AUTO: 3.8 M/UL (ref 4.35–5.65)
SODIUM SERPL-SCNC: 134 MMOL/L (ref 136–145)
WBC # BLD AUTO: 9.7 K/UL (ref 4.6–13.2)

## 2023-05-24 PROCEDURE — 73070 X-RAY EXAM OF ELBOW: CPT

## 2023-05-24 PROCEDURE — 96375 TX/PRO/DX INJ NEW DRUG ADDON: CPT

## 2023-05-24 PROCEDURE — 70450 CT HEAD/BRAIN W/O DYE: CPT

## 2023-05-24 PROCEDURE — 99285 EMERGENCY DEPT VISIT HI MDM: CPT

## 2023-05-24 PROCEDURE — 6360000002 HC RX W HCPCS: Performed by: EMERGENCY MEDICINE

## 2023-05-24 PROCEDURE — 2500000003 HC RX 250 WO HCPCS: Performed by: EMERGENCY MEDICINE

## 2023-05-24 PROCEDURE — 6370000000 HC RX 637 (ALT 250 FOR IP): Performed by: EMERGENCY MEDICINE

## 2023-05-24 PROCEDURE — 71260 CT THORAX DX C+: CPT

## 2023-05-24 PROCEDURE — 96376 TX/PRO/DX INJ SAME DRUG ADON: CPT

## 2023-05-24 PROCEDURE — 73030 X-RAY EXAM OF SHOULDER: CPT

## 2023-05-24 PROCEDURE — 6360000004 HC RX CONTRAST MEDICATION: Performed by: EMERGENCY MEDICINE

## 2023-05-24 PROCEDURE — 80048 BASIC METABOLIC PNL TOTAL CA: CPT

## 2023-05-24 PROCEDURE — 96374 THER/PROPH/DIAG INJ IV PUSH: CPT

## 2023-05-24 PROCEDURE — 85025 COMPLETE CBC W/AUTO DIFF WBC: CPT

## 2023-05-24 RX ORDER — ONDANSETRON 2 MG/ML
4 INJECTION INTRAMUSCULAR; INTRAVENOUS
Status: COMPLETED | OUTPATIENT
Start: 2023-05-24 | End: 2023-05-24

## 2023-05-24 RX ORDER — LORAZEPAM 1 MG/1
1 TABLET ORAL ONCE
Status: COMPLETED | OUTPATIENT
Start: 2023-05-24 | End: 2023-05-24

## 2023-05-24 RX ORDER — HYDROMORPHONE HYDROCHLORIDE 1 MG/ML
2 INJECTION, SOLUTION INTRAMUSCULAR; INTRAVENOUS; SUBCUTANEOUS ONCE
Status: COMPLETED | OUTPATIENT
Start: 2023-05-24 | End: 2023-05-24

## 2023-05-24 RX ORDER — LORAZEPAM 1 MG/1
1 TABLET ORAL EVERY 6 HOURS PRN
Qty: 10 TABLET | Refills: 0 | Status: SHIPPED | OUTPATIENT
Start: 2023-05-24 | End: 2023-06-23

## 2023-05-24 RX ORDER — NALOXONE HYDROCHLORIDE 4 MG/.1ML
1 SPRAY NASAL PRN
Qty: 1 EACH | Refills: 0 | Status: SHIPPED | OUTPATIENT
Start: 2023-05-24

## 2023-05-24 RX ORDER — HYDROMORPHONE HYDROCHLORIDE 1 MG/ML
1.5 INJECTION, SOLUTION INTRAMUSCULAR; INTRAVENOUS; SUBCUTANEOUS ONCE
Status: COMPLETED | OUTPATIENT
Start: 2023-05-24 | End: 2023-05-24

## 2023-05-24 RX ORDER — ONDANSETRON 4 MG/1
4 TABLET, FILM COATED ORAL 3 TIMES DAILY PRN
Qty: 15 TABLET | Refills: 0 | Status: SHIPPED | OUTPATIENT
Start: 2023-05-24

## 2023-05-24 RX ORDER — HYDROMORPHONE HYDROCHLORIDE 2 MG/1
2 TABLET ORAL
Qty: 20 TABLET | Refills: 0 | Status: SHIPPED | OUTPATIENT
Start: 2023-05-24 | End: 2023-05-27

## 2023-05-24 RX ADMIN — IOPAMIDOL 100 ML: 612 INJECTION, SOLUTION INTRAVENOUS at 12:22

## 2023-05-24 RX ADMIN — ONDANSETRON 4 MG: 2 INJECTION INTRAMUSCULAR; INTRAVENOUS at 12:48

## 2023-05-24 RX ADMIN — LORAZEPAM 1 MG: 1 TABLET ORAL at 14:42

## 2023-05-24 RX ADMIN — HYDROMORPHONE HYDROCHLORIDE 2 MG: 1 INJECTION, SOLUTION INTRAMUSCULAR; INTRAVENOUS; SUBCUTANEOUS at 14:42

## 2023-05-24 RX ADMIN — HYDROMORPHONE HYDROCHLORIDE 1.5 MG: 1 INJECTION, SOLUTION INTRAMUSCULAR; INTRAVENOUS; SUBCUTANEOUS at 12:49

## 2023-05-24 ASSESSMENT — PAIN DESCRIPTION - LOCATION
LOCATION: SHOULDER

## 2023-05-24 ASSESSMENT — PAIN SCALES - GENERAL
PAINLEVEL_OUTOF10: 6
PAINLEVEL_OUTOF10: 8
PAINLEVEL_OUTOF10: 6

## 2023-05-24 ASSESSMENT — PAIN DESCRIPTION - ORIENTATION
ORIENTATION: LEFT

## 2023-05-24 ASSESSMENT — PAIN - FUNCTIONAL ASSESSMENT: PAIN_FUNCTIONAL_ASSESSMENT: 0-10

## 2023-05-24 NOTE — ED TRIAGE NOTES
Patient fell last night injuring  left shoulder and has a laceration to head. Patient denies LOC. Patient unable to move left arm. Patient is alert and oriented x4, respirations are equal and unlabored bilaterally.

## 2023-05-24 NOTE — ED PROVIDER NOTES
EMERGENCY DEPARTMENT HISTORY AND PHYSICAL EXAM    Date: 5/24/2023  Patient Name: Jocelyn Weston    History of Presenting Illness     Chief Complaint   Patient presents with    Shoulder Injury    Fall         History Provided By: Patient    Additional History (Context):   12:05 PM EDT  Jocelyn Weston is a 81 y.o. male with PMHX of reflux, hypertension, migraines, sleep apnea, SVT who presents to the emergency department C/O fall with head and shoulder injury. Patient presents today after falling last night mechanical fall. He had no preceding headache chest pain shortness of breath or other symptoms. Moderate to assess the patient. Will demonstrate no inability to use his left arm due to significant pain. No pain nausea vomiting. He denies other injuries to his torso right arm or legs. He is able to eat and drink without difficulty. Complains of little to no headache. Social History  Occasionally smokes cigarettes. Occasionally drinks alcohol. He has no illegal drug use    Family History  Denies bleeding disorders. PCP: Chepe Doan MD    No current facility-administered medications for this encounter. Current Outpatient Medications   Medication Sig Dispense Refill    HYDROmorphone (DILAUDID) 2 MG tablet Take 1 tablet by mouth every 3 hours as needed for Pain for up to 3 days. Max Daily Amount: 16 mg 20 tablet 0    LORazepam (ATIVAN) 1 MG tablet Take 1 tablet by mouth every 6 hours as needed for Anxiety for up to 30 days. Max Daily Amount: 4 mg 10 tablet 0    naloxone (NARCAN) 4 MG/0.1ML LIQD nasal spray 1 spray by Nasal route as needed for Opioid Reversal 1 each 0    ondansetron (ZOFRAN) 4 MG tablet Take 1 tablet by mouth 3 times daily as needed for Nausea or Vomiting 15 tablet 0       Past History   [id number]  Past Medical History:  History reviewed. No pertinent past medical history. Past Surgical History:  History reviewed. No pertinent surgical history.     Family

## 2023-06-07 ENCOUNTER — ANESTHESIA (OUTPATIENT)
Facility: HOSPITAL | Age: 73
End: 2023-06-07
Payer: MEDICARE

## 2023-06-07 ENCOUNTER — ANESTHESIA EVENT (OUTPATIENT)
Facility: HOSPITAL | Age: 73
End: 2023-06-07
Payer: MEDICARE

## 2023-06-07 ENCOUNTER — APPOINTMENT (OUTPATIENT)
Facility: HOSPITAL | Age: 73
End: 2023-06-07
Attending: ORTHOPAEDIC SURGERY
Payer: MEDICARE

## 2023-06-07 ENCOUNTER — HOSPITAL ENCOUNTER (OUTPATIENT)
Facility: HOSPITAL | Age: 73
Setting detail: OUTPATIENT SURGERY
Discharge: HOME | End: 2023-06-07
Attending: ORTHOPAEDIC SURGERY | Admitting: ORTHOPAEDIC SURGERY
Payer: MEDICARE

## 2023-06-07 VITALS
WEIGHT: 270 LBS | RESPIRATION RATE: 16 BRPM | TEMPERATURE: 97.3 F | SYSTOLIC BLOOD PRESSURE: 133 MMHG | BODY MASS INDEX: 33.57 KG/M2 | DIASTOLIC BLOOD PRESSURE: 74 MMHG | HEART RATE: 63 BPM | OXYGEN SATURATION: 92 % | HEIGHT: 75 IN

## 2023-06-07 DIAGNOSIS — S43.102S AC SEPARATION, LEFT, SEQUELA: Primary | ICD-10-CM

## 2023-06-07 PROCEDURE — 2720000010 HC SURG SUPPLY STERILE: Performed by: ORTHOPAEDIC SURGERY

## 2023-06-07 PROCEDURE — 3600000012 HC SURGERY LEVEL 2 ADDTL 15MIN: Performed by: ORTHOPAEDIC SURGERY

## 2023-06-07 PROCEDURE — 3700000000 HC ANESTHESIA ATTENDED CARE: Performed by: ORTHOPAEDIC SURGERY

## 2023-06-07 PROCEDURE — 7100000001 HC PACU RECOVERY - ADDTL 15 MIN: Performed by: ORTHOPAEDIC SURGERY

## 2023-06-07 PROCEDURE — 6360000002 HC RX W HCPCS: Performed by: SPECIALIST

## 2023-06-07 PROCEDURE — 2709999900 HC NON-CHARGEABLE SUPPLY: Performed by: ORTHOPAEDIC SURGERY

## 2023-06-07 PROCEDURE — 7100000010 HC PHASE II RECOVERY - FIRST 15 MIN: Performed by: ORTHOPAEDIC SURGERY

## 2023-06-07 PROCEDURE — 6370000000 HC RX 637 (ALT 250 FOR IP): Performed by: ANESTHESIOLOGY

## 2023-06-07 PROCEDURE — C1713 ANCHOR/SCREW BN/BN,TIS/BN: HCPCS | Performed by: ORTHOPAEDIC SURGERY

## 2023-06-07 PROCEDURE — 7100000000 HC PACU RECOVERY - FIRST 15 MIN: Performed by: ORTHOPAEDIC SURGERY

## 2023-06-07 PROCEDURE — 64415 NJX AA&/STRD BRCH PLXS IMG: CPT | Performed by: SPECIALIST

## 2023-06-07 PROCEDURE — 3600000002 HC SURGERY LEVEL 2 BASE: Performed by: ORTHOPAEDIC SURGERY

## 2023-06-07 PROCEDURE — 2500000003 HC RX 250 WO HCPCS

## 2023-06-07 PROCEDURE — 3700000001 HC ADD 15 MINUTES (ANESTHESIA): Performed by: ORTHOPAEDIC SURGERY

## 2023-06-07 PROCEDURE — 6360000002 HC RX W HCPCS: Performed by: ORTHOPAEDIC SURGERY

## 2023-06-07 PROCEDURE — 2580000003 HC RX 258: Performed by: ORTHOPAEDIC SURGERY

## 2023-06-07 PROCEDURE — 6360000002 HC RX W HCPCS

## 2023-06-07 PROCEDURE — 7100000011 HC PHASE II RECOVERY - ADDTL 15 MIN: Performed by: ORTHOPAEDIC SURGERY

## 2023-06-07 PROCEDURE — 2500000003 HC RX 250 WO HCPCS: Performed by: SPECIALIST

## 2023-06-07 DEVICE — SCREW BNE L20MM DIA3.5MM CORT S STL ST NONCANNULATED LOK: Type: IMPLANTABLE DEVICE | Site: SHOULDER | Status: FUNCTIONAL

## 2023-06-07 DEVICE — IMPLANTABLE DEVICE: Type: IMPLANTABLE DEVICE | Site: SHOULDER | Status: FUNCTIONAL

## 2023-06-07 DEVICE — SCREW BNE L22MM DIA3.5MM CORT S STL ST LOK FULL THRD: Type: IMPLANTABLE DEVICE | Site: SHOULDER | Status: FUNCTIONAL

## 2023-06-07 DEVICE — SCREW BNE L20MM DIA3.5MM CORT S STL ST LOK FULL THRD: Type: IMPLANTABLE DEVICE | Site: SHOULDER | Status: FUNCTIONAL

## 2023-06-07 RX ORDER — ONDANSETRON 2 MG/ML
4 INJECTION INTRAMUSCULAR; INTRAVENOUS
Status: DISCONTINUED | OUTPATIENT
Start: 2023-06-07 | End: 2023-06-07 | Stop reason: HOSPADM

## 2023-06-07 RX ORDER — DEXAMETHASONE SODIUM PHOSPHATE 10 MG/ML
INJECTION, SOLUTION INTRAMUSCULAR; INTRAVENOUS
Status: COMPLETED
Start: 2023-06-07 | End: 2023-06-07

## 2023-06-07 RX ORDER — MIDAZOLAM HYDROCHLORIDE 1 MG/ML
INJECTION INTRAMUSCULAR; INTRAVENOUS PRN
Status: DISCONTINUED | OUTPATIENT
Start: 2023-06-07 | End: 2023-06-07 | Stop reason: SDUPTHER

## 2023-06-07 RX ORDER — SODIUM CHLORIDE 9 MG/ML
INJECTION, SOLUTION INTRAVENOUS PRN
Status: DISCONTINUED | OUTPATIENT
Start: 2023-06-07 | End: 2023-06-07 | Stop reason: HOSPADM

## 2023-06-07 RX ORDER — OXYCODONE HYDROCHLORIDE 5 MG/1
5 TABLET ORAL
Status: COMPLETED | OUTPATIENT
Start: 2023-06-07 | End: 2023-06-07

## 2023-06-07 RX ORDER — ROPIVACAINE HYDROCHLORIDE 5 MG/ML
INJECTION, SOLUTION EPIDURAL; INFILTRATION; PERINEURAL
Status: COMPLETED | OUTPATIENT
Start: 2023-06-07 | End: 2023-06-07

## 2023-06-07 RX ORDER — LABETALOL HYDROCHLORIDE 5 MG/ML
5 INJECTION, SOLUTION INTRAVENOUS
Status: DISCONTINUED | OUTPATIENT
Start: 2023-06-07 | End: 2023-06-07 | Stop reason: HOSPADM

## 2023-06-07 RX ORDER — ESMOLOL HYDROCHLORIDE 10 MG/ML
INJECTION INTRAVENOUS PRN
Status: DISCONTINUED | OUTPATIENT
Start: 2023-06-07 | End: 2023-06-07 | Stop reason: SDUPTHER

## 2023-06-07 RX ORDER — PROPOFOL 10 MG/ML
INJECTION, EMULSION INTRAVENOUS CONTINUOUS PRN
Status: DISCONTINUED | OUTPATIENT
Start: 2023-06-07 | End: 2023-06-07 | Stop reason: SDUPTHER

## 2023-06-07 RX ORDER — DIPHENHYDRAMINE HYDROCHLORIDE 50 MG/ML
12.5 INJECTION INTRAMUSCULAR; INTRAVENOUS
Status: DISCONTINUED | OUTPATIENT
Start: 2023-06-07 | End: 2023-06-07 | Stop reason: HOSPADM

## 2023-06-07 RX ORDER — SODIUM CHLORIDE, SODIUM LACTATE, POTASSIUM CHLORIDE, CALCIUM CHLORIDE 600; 310; 30; 20 MG/100ML; MG/100ML; MG/100ML; MG/100ML
INJECTION, SOLUTION INTRAVENOUS CONTINUOUS
Status: DISCONTINUED | OUTPATIENT
Start: 2023-06-07 | End: 2023-06-07 | Stop reason: HOSPADM

## 2023-06-07 RX ORDER — MIDAZOLAM HYDROCHLORIDE 1 MG/ML
INJECTION INTRAMUSCULAR; INTRAVENOUS
Status: COMPLETED
Start: 2023-06-07 | End: 2023-06-07

## 2023-06-07 RX ORDER — MIDAZOLAM HYDROCHLORIDE 2 MG/2ML
2 INJECTION, SOLUTION INTRAMUSCULAR; INTRAVENOUS
Status: DISCONTINUED | OUTPATIENT
Start: 2023-06-07 | End: 2023-06-07 | Stop reason: HOSPADM

## 2023-06-07 RX ORDER — HYDROMORPHONE HYDROCHLORIDE 1 MG/ML
0.5 INJECTION, SOLUTION INTRAMUSCULAR; INTRAVENOUS; SUBCUTANEOUS EVERY 5 MIN PRN
Status: DISCONTINUED | OUTPATIENT
Start: 2023-06-07 | End: 2023-06-07 | Stop reason: HOSPADM

## 2023-06-07 RX ORDER — ACETAMINOPHEN 325 MG/1
650 TABLET ORAL
Status: DISCONTINUED | OUTPATIENT
Start: 2023-06-07 | End: 2023-06-07 | Stop reason: HOSPADM

## 2023-06-07 RX ORDER — SODIUM CHLORIDE 0.9 % (FLUSH) 0.9 %
5-40 SYRINGE (ML) INJECTION EVERY 12 HOURS SCHEDULED
Status: DISCONTINUED | OUTPATIENT
Start: 2023-06-07 | End: 2023-06-07 | Stop reason: HOSPADM

## 2023-06-07 RX ORDER — KETAMINE HCL IN NACL, ISO-OSM 100MG/10ML
SYRINGE (ML) INJECTION PRN
Status: DISCONTINUED | OUTPATIENT
Start: 2023-06-07 | End: 2023-06-07 | Stop reason: SDUPTHER

## 2023-06-07 RX ORDER — DEXMEDETOMIDINE HYDROCHLORIDE 100 UG/ML
INJECTION, SOLUTION INTRAVENOUS
Status: COMPLETED | OUTPATIENT
Start: 2023-06-07 | End: 2023-06-07

## 2023-06-07 RX ORDER — GLYCOPYRROLATE 0.2 MG/ML
INJECTION INTRAMUSCULAR; INTRAVENOUS PRN
Status: DISCONTINUED | OUTPATIENT
Start: 2023-06-07 | End: 2023-06-07 | Stop reason: SDUPTHER

## 2023-06-07 RX ORDER — DEXAMETHASONE SODIUM PHOSPHATE 10 MG/ML
INJECTION, SOLUTION INTRAMUSCULAR; INTRAVENOUS
Status: COMPLETED | OUTPATIENT
Start: 2023-06-07 | End: 2023-06-07

## 2023-06-07 RX ORDER — MEPERIDINE HYDROCHLORIDE 50 MG/ML
12.5 INJECTION INTRAMUSCULAR; INTRAVENOUS; SUBCUTANEOUS EVERY 5 MIN PRN
Status: DISCONTINUED | OUTPATIENT
Start: 2023-06-07 | End: 2023-06-07 | Stop reason: HOSPADM

## 2023-06-07 RX ORDER — FENTANYL CITRATE 50 UG/ML
25 INJECTION, SOLUTION INTRAMUSCULAR; INTRAVENOUS EVERY 5 MIN PRN
Status: DISCONTINUED | OUTPATIENT
Start: 2023-06-07 | End: 2023-06-07 | Stop reason: HOSPADM

## 2023-06-07 RX ORDER — SODIUM CHLORIDE 0.9 % (FLUSH) 0.9 %
5-40 SYRINGE (ML) INJECTION PRN
Status: DISCONTINUED | OUTPATIENT
Start: 2023-06-07 | End: 2023-06-07 | Stop reason: HOSPADM

## 2023-06-07 RX ORDER — OXYCODONE HYDROCHLORIDE AND ACETAMINOPHEN 5; 325 MG/1; MG/1
1 TABLET ORAL EVERY 6 HOURS PRN
Qty: 18 TABLET | Refills: 0 | Status: SHIPPED | OUTPATIENT
Start: 2023-06-07 | End: 2023-06-21

## 2023-06-07 RX ORDER — LIDOCAINE HYDROCHLORIDE 20 MG/ML
INJECTION, SOLUTION EPIDURAL; INFILTRATION; INTRACAUDAL; PERINEURAL PRN
Status: DISCONTINUED | OUTPATIENT
Start: 2023-06-07 | End: 2023-06-07 | Stop reason: SDUPTHER

## 2023-06-07 RX ORDER — PROPOFOL 10 MG/ML
INJECTION, EMULSION INTRAVENOUS PRN
Status: DISCONTINUED | OUTPATIENT
Start: 2023-06-07 | End: 2023-06-07 | Stop reason: SDUPTHER

## 2023-06-07 RX ADMIN — MEPIVACAINE HYDROCHLORIDE 5 ML: 15 INJECTION, SOLUTION EPIDURAL; INFILTRATION at 13:11

## 2023-06-07 RX ADMIN — Medication 20 MG: at 14:35

## 2023-06-07 RX ADMIN — HYDROMORPHONE HYDROCHLORIDE 0.5 MG: 1 INJECTION, SOLUTION INTRAMUSCULAR; INTRAVENOUS; SUBCUTANEOUS at 15:31

## 2023-06-07 RX ADMIN — PROPOFOL 40 MG: 10 INJECTION, EMULSION INTRAVENOUS at 13:47

## 2023-06-07 RX ADMIN — DEXMEDETOMIDINE HYDROCHLORIDE 0.02 ML: 100 INJECTION, SOLUTION INTRAVENOUS at 13:11

## 2023-06-07 RX ADMIN — LIDOCAINE HYDROCHLORIDE 100 MG: 20 INJECTION, SOLUTION EPIDURAL; INFILTRATION; INTRACAUDAL; PERINEURAL at 13:46

## 2023-06-07 RX ADMIN — PROPOFOL 40 MG: 10 INJECTION, EMULSION INTRAVENOUS at 13:52

## 2023-06-07 RX ADMIN — PROPOFOL 40 MG: 10 INJECTION, EMULSION INTRAVENOUS at 13:49

## 2023-06-07 RX ADMIN — SODIUM CHLORIDE, POTASSIUM CHLORIDE, SODIUM LACTATE AND CALCIUM CHLORIDE: 600; 310; 30; 20 INJECTION, SOLUTION INTRAVENOUS at 12:44

## 2023-06-07 RX ADMIN — PROPOFOL 20 MG: 10 INJECTION, EMULSION INTRAVENOUS at 14:09

## 2023-06-07 RX ADMIN — PROPOFOL 40 MG: 10 INJECTION, EMULSION INTRAVENOUS at 14:47

## 2023-06-07 RX ADMIN — GLYCOPYRROLATE 0.2 MG: 0.2 INJECTION, SOLUTION INTRAMUSCULAR; INTRAVENOUS at 14:18

## 2023-06-07 RX ADMIN — PROPOFOL 75 MCG/KG/MIN: 10 INJECTION, EMULSION INTRAVENOUS at 13:52

## 2023-06-07 RX ADMIN — DEXAMETHASONE SODIUM PHOSPHATE 4 MG: 10 INJECTION, SOLUTION INTRAMUSCULAR; INTRAVENOUS at 13:11

## 2023-06-07 RX ADMIN — ESMOLOL HYDROCHLORIDE 40 MG: 10 INJECTION, SOLUTION INTRAVENOUS at 14:47

## 2023-06-07 RX ADMIN — Medication 30 MG: at 14:13

## 2023-06-07 RX ADMIN — LIDOCAINE HYDROCHLORIDE 100 MG: 20 INJECTION, SOLUTION EPIDURAL; INFILTRATION; INTRACAUDAL; PERINEURAL at 14:47

## 2023-06-07 RX ADMIN — ROPIVACAINE HYDROCHLORIDE 20 ML: 5 INJECTION, SOLUTION EPIDURAL; INFILTRATION; PERINEURAL at 13:11

## 2023-06-07 RX ADMIN — MIDAZOLAM 2 MG: 1 INJECTION INTRAMUSCULAR; INTRAVENOUS at 13:08

## 2023-06-07 RX ADMIN — PROPOFOL 40 MG: 10 INJECTION, EMULSION INTRAVENOUS at 13:46

## 2023-06-07 RX ADMIN — OXYCODONE HYDROCHLORIDE 5 MG: 5 TABLET ORAL at 15:48

## 2023-06-07 RX ADMIN — Medication 3000 MG: at 13:43

## 2023-06-07 ASSESSMENT — PAIN DESCRIPTION - ORIENTATION
ORIENTATION: LEFT

## 2023-06-07 ASSESSMENT — PAIN SCALES - GENERAL
PAINLEVEL_OUTOF10: 7
PAINLEVEL_OUTOF10: 9
PAINLEVEL_OUTOF10: 4
PAINLEVEL_OUTOF10: 5
PAINLEVEL_OUTOF10: 9

## 2023-06-07 ASSESSMENT — PAIN DESCRIPTION - LOCATION
LOCATION: NECK

## 2023-06-07 ASSESSMENT — PAIN DESCRIPTION - DESCRIPTORS
DESCRIPTORS: SHARP;THROBBING
DESCRIPTORS: ACHING;DULL
DESCRIPTORS: SHARP;THROBBING

## 2023-06-07 ASSESSMENT — PAIN - FUNCTIONAL ASSESSMENT: PAIN_FUNCTIONAL_ASSESSMENT: 0-10

## 2023-06-07 NOTE — PERIOP NOTE
Pt. Used restroom in pre-op area with assistance. Patient placed on Elijah Paws for a minimum of 30 min in  Preop.

## 2023-06-07 NOTE — PERIOP NOTE
TRANSFER - IN REPORT:    Verbal report received from [facility] (name) on Ofe Kc  being received from PACU (unit) for routine progression of patient care      Report consisted of patients Situation, Background, Assessment and   Recommendations(SBAR). Information from the following report(s) Nurse Handoff Report, Surgery Report, Intake/Output, and MAR was reviewed with the receiving nurse. Opportunity for questions and clarification was provided.       Assessment completed upon patients arrival to unit and care assume

## 2023-06-07 NOTE — PERIOP NOTE
TRANSFER - IN REPORT:    Verbal report received from OR RN on Geri Russell  being received from OR for routine progression of patient care      Report consisted of patient's Situation, Background, Assessment and   Recommendations(SBAR). Information from the following report(s) Adult Overview, Surgery Report, Intake/Output, and MAR was reviewed with the receiving nurse. Opportunity for questions and clarification was provided. Assessment completed upon patient's arrival to unit and care assumed.

## 2023-06-07 NOTE — ANESTHESIA POSTPROCEDURE EVALUATION
Post-Anesthesia Evaluation & Assessment    Vitals  BP: 122/73  Temp: 97.6 °F (36.4 °C)  Temp Source: Temporal  Pulse: 72  Respirations: 21  SpO2: 94 %  Height: 6' 3\" (190.5 cm)  Weight - Scale: 270 lb (122.5 kg)  Pain Level: 4    Nausea/Vomiting: Controlled. Post-operative hydration adequate. Pain managed. Mental status & Level of consciousness: alert and oriented x 3    Neurological status: left upper extremity weakness secondary to nerve block    Pulmonary status: airway patent, adequate oxygenation. Complications related to anesthesia: none    Patient has met all PACU discharge requirements.       Brent Shirley DO
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
## 2023-06-07 NOTE — ANESTHESIA PROCEDURE NOTES
Peripheral Block    Patient location during procedure: holding area  Reason for block: procedure for pain, post-op pain management, primary anesthetic and at surgeon's request  Start time: 6/7/2023 1:08 PM  End time: 6/7/2023 1:11 PM  Staffing  Performed: anesthesiologist   Anesthesiologist: Grady Mckoy MD  Preanesthetic Checklist  Completed: patient identified, IV checked, site marked, risks and benefits discussed, surgical/procedural consents, equipment checked, pre-op evaluation, timeout performed, anesthesia consent given, oxygen available, monitors applied/VS acknowledged, fire risk safety assessment completed and verbalized and blood product R/B/A discussed and consented  Peripheral Block   Patient position: supine  Prep: ChloraPrep  Provider prep: mask and sterile gloves  Patient monitoring: cardiac monitor, continuous pulse ox, frequent blood pressure checks, IV access, oxygen and responsive to questions  Block type: Brachial plexus  Interscalene  Laterality: left  Injection technique: single-shot  Guidance: nerve stimulator and ultrasound guided    Needle   Needle type: insulated echogenic nerve stimulator needle   Needle gauge: 21 G  Needle localization: ultrasound guidance and nerve stimulator  Needle insertion depth: 1.5 cm  Needle length: 5 cm  Assessment   Injection assessment: negative aspiration for heme, no paresthesia on injection, local visualized surrounding nerve on ultrasound and no intravascular symptoms  Paresthesia pain: none  Slow fractionated injection: yes  Hemodynamics: stable  Real-time US image taken/store: yes  Outcomes: uncomplicated and patient tolerated procedure well    Medications Administered  dexamethasone (DECADRON) (PF) 10 mg/mL injection - Other   4 mg - 6/7/2023 1:11:00 PM  dexmedetomidine (PRECEDEX) injection 200 mcg/2 mL - Perineural   0.02 mL - 6/7/2023 1:11:00 PM  mepivacaine (CARBOCAINE) injection 1.5% - Perineural   5 mL - 6/7/2023 1:11:00 PM  ropivacaine (NAROPIN)
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
## 2023-06-07 NOTE — DISCHARGE INSTRUCTIONS
Upper Extremity Surgery Discharge Instructions  Dr. Mckayla Wilkins    Please take the time to review the following instructions before you leave the hospital and use them as guidelines during your recovery from surgery. If you have any questions, you may contact my office at [phone]. Wound Care / Dressing Change    __________ Do NOT remove your dressing or get them wet.     _____x_____ You may change your dressings as needed. Two days after your surgery date you should remove your dressing. A big, bulky dressing isn't necessary as long as there isn't any drainage from the incisions. If there is drainage you can put a band-aid, primapore, or mepilex dressing over the incision and change it daily until drainage stops. It isn't necessary to apply antibiotic ointment to your incisions. If you have glue over your incision do not peel it off. If you have steri-strips over your incision they will start to peel off in 7-10 days. They don't need to be removed prior to that. When they begin to peel off you can remove them. They should all be removed by 14 days from you surgery. Keep a towel or gauze in any skin folds that may hang over the incision so that it stays dry. Showering / Bathing    ____x______ Oanh Matos may shower 2 days after your surgery. Your dressing may be removed for showering. You may get your incision and the dressing adhered to your skin wet in the shower. Do not vigorously scrub your incision or the adherent dressing. Apply a clean, dry dressing after you have dried your incisions if there is drainage. Do not take a bath or get into a swimming pool or Jacuzzi until further instruction. Do not soak your incisions under water.     __________ Do not get the clear, plastic dressing wet. Once you remove it four days from surgery, you may get the incision wet if there is no drainage. If there is drainage, please call the office.      Sling    _____x____ Prateekon Shawna are not required to wear a sling and

## 2023-06-07 NOTE — PERIOP NOTE
TRANSFER - OUT REPORT:    Verbal report given to [address] on Sumeet Krueger  being transferred to phase 2 for routine progression of patient care       Report consisted of patient's Situation, Background, Assessment and   Recommendations(SBAR). Information from the following report(s) Adult Overview, Surgery Report, Intake/Output, and MAR was reviewed with the receiving nurse. [place] Assessment: No data recorded  Lines:   Peripheral IV 06/07/23 Right Hand (Active)   Site Assessment Clean, dry & intact 06/07/23 1525   Line Status Infusing 06/07/23 [facility] [place] [address] Connections checked and tightened 06/07/23 1338   Phlebitis Assessment No symptoms 06/07/23 1525   Infiltration Assessment 0 06/07/23 1525   Alcohol Cap Used No 06/07/23 1525   Dressing Status Clean, dry & intact 06/07/23 1525   Dressing Type Transparent 06/07/23 1525        Opportunity for questions and clarification was provided.       Patient transported with:  Registered Nurse

## 2023-06-07 NOTE — BRIEF OP NOTE
Brief Postoperative Note      Patient: Juan Allison  YOB: 1950  MRN: [id number]    Date of Procedure: 6/7/2023    Pre-Op Diagnosis Codes:     * Open dislocation of clavicle, unspecified laterality, initial encounter [S43.109A, S41.009A]    Post-Op Diagnosis: Same       Procedure(s):  LEFT SHOUDLER OPEN ACROMIOCLAVICULAR JOINT OPEN REDUCTION INTERNAL FIXATION W/C-ARM    Surgeon(s):  Renold Councilman, MD    Assistant:  Physician Assistant: Jesus Manuel Connelly PA-C    Anesthesia: Monitor Anesthesia Care    Estimated Blood Loss (mL): less than 405     Complications: None    Specimens:   * No specimens in log *    Implants:  Implant Name Type Inv.  Item Serial No.  Lot No. LRB No. Used Action   PLATE BNE [id number] HK [id number] 4 H L CLAV S STL SANTA COMPR FOR - [id number]  PLATE BNE F09AR HK [id number] 4 H L CLAV S STL SANTA COMPR FOR  DEPUY [employer] [place]- [id number] Left 1 Implanted   SCREW BNE L20MM DIA3.5MM BRYCE S STL ST SANAT FULL THRD - [id number]  SCREW BNE L20MM DIA3.5MM BRYCE S STL ST SANTA FULL THRD  DEPUY [employer]- Left 1 Implanted   SCREW BNE L22MM DIA3.5MM BRYCE S STL ST SANTA FULL THRD - [id number]  SCREW BNE L22MM DIA3.5MM BRYCE S STL ST SANTA FULL THRD  DEPUY [employer]- Left 2 Implanted   SCREW BNE L20MM DIA3.5MM BRYCE S STL ST NONCANNULATED SANTA - [id number]  SCREW BNE L20MM DIA3.5MM BRYCE S STL ST NONCANNULATED SANTA  DEPUY SYN[employer]- Left 1 Implanted         Drains: * No LDAs found *    Findings: ac orif      Electronically signed by Demond Gant MD on 6/7/2023 at 2:56 PM

## 2023-06-08 NOTE — OP NOTE
[facility] [place]  OPERATIVE REPORT    Name:  Magalys Deal  MR#:   [id number]  :  1950  ACCOUNT #:  [de-identified]  DATE OF SERVICE:  2023    PREOPERATIVE DIAGNOSIS:  Left acromioclavicular joint separation, grade III. POSTOPERATIVE DIAGNOSIS:  Left acromioclavicular joint separation, grade III. PROCEDURE PERFORMED:  Left shoulder open reduction and internal fixation using Synthes hook plate. SURGEON:  Racheal Garcia MD.    Catrachito Romero. ANESTHESIA:  General and regional.    COMPLICATIONS:  None. SPECIMENS REMOVED:  None. IMPLANTS:  Synthes hook plate. ESTIMATED BLOOD LOSS:  Less than 100 mL. INDICATIONS:  The patient is a 72-year-old gentleman, who suffered a grade III AC separation. He has had continued pain and difficulty. He opted to have this surgically corrected. We did review the risks and benefits with him at length. PROCEDURE:  The patient was brought to the operating suite, properly identified, placed supine upon the operating table, and placed under a general anesthetic. He did have a block placed in holding. Once adequate level of anesthesia was obtained, he was prepped and draped in the usual sterile fashion. A curvilinear incision was made over the distal clavicle and acromion. We dissected down through subcutaneous tissue. We did encounter the hemarthrosis, this was irrigated. The displaced clavicle was exposed. We were able to reduce this down to the level of the acromion. An 18-mm hook plate was selected. A trial was utilized to verify that this would adequately reduce the joint. Fluoroscopic imaging was utilized. Once we were happy with that, a real plate was then selected and placed overlying the clavicle with the hook under the acromion. This was screwed into place in a combination of a compression locking fashion.     Once that was completed, final fluoroscopic imaging did verify adequate positioning of the hook plate

## 2023-07-27 ENCOUNTER — HOSPITAL ENCOUNTER (OUTPATIENT)
Facility: HOSPITAL | Age: 73
Setting detail: RECURRING SERIES
Discharge: HOME | End: 2023-07-30
Payer: MEDICARE

## 2023-07-27 PROCEDURE — 97161 PT EVAL LOW COMPLEX 20 MIN: CPT
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
## 2023-08-01 ENCOUNTER — HOSPITAL ENCOUNTER (OUTPATIENT)
Facility: HOSPITAL | Age: 73
Setting detail: RECURRING SERIES
Discharge: HOME | End: 2023-08-04
Payer: MEDICARE

## 2023-08-01 PROCEDURE — 97112 NEUROMUSCULAR REEDUCATION: CPT

## 2023-08-01 PROCEDURE — 97110 THERAPEUTIC EXERCISES: CPT

## 2023-08-01 PROCEDURE — 97530 THERAPEUTIC ACTIVITIES: CPT

## 2023-08-01 NOTE — PROGRESS NOTES
PHYSICAL / OCCUPATIONAL THERAPY - DAILY TREATMENT NOTE (updated )    Patient Name: Kain Livingston    Date: 2023    : 1950  Insurance: Payor: MEDICARE / Plan: MEDICARE PART A AND B / Product Type: *No Product type* /      Patient  verified Yes     Visit #   Current / Total 2 16   Time   In / Out 1209 1251   Pain   In / Out 0 0   Subjective Functional Status/Changes: Patient reports compliance with HEP. Changes to: Allergies, Med Hx, Sx Hx?   no       TREATMENT AREA =  Pain in left shoulder [M25.512]    OBJECTIVE    Therapeutic Procedures: Tx Min Billable or 1:1 Min (if diff from Tx Min) Procedure, Rationale, Specifics   20  54255 Therapeutic Exercise (timed):  increase ROM, strength, coordination, balance, and proprioception to improve patient's ability to progress to PLOF and address remaining functional goals. (see flow sheet as applicable)     Details if applicable:       10  89926 Therapeutic Activity (timed):  use of dynamic activities replicating functional movements to increase ROM, strength, coordination, balance, and proprioception in order to improve patient's ability to progress to PLOF and address remaining functional goals. (see flow sheet as applicable)     Details if applicable:     12  93736 Neuromuscular Re-Education (timed):  improve balance, coordination, kinesthetic sense, posture, core stability and proprioception to improve patient's ability to develop conscious control of individual muscles and awareness of position of extremities in order to progress to PLOF and address remaining functional goals.  (see flow sheet as applicable)     Details if applicable:     43  [facility] Totals Reminder: bill using total billable min of TIMED therapeutic procedures (example: do not include dry needle or estim unattended, both untimed codes, in totals to left)  8-22 min = 1 unit; 23-37 min = 2 units; 38-52 min = 3 units; 53-67 min = 4 units; 68-82 min = 5 units   Total Total     TOTAL

## 2023-08-08 ENCOUNTER — HOSPITAL ENCOUNTER (OUTPATIENT)
Facility: HOSPITAL | Age: 73
Setting detail: RECURRING SERIES
Discharge: HOME | End: 2023-08-11
Payer: MEDICARE

## 2023-08-08 PROCEDURE — 97530 THERAPEUTIC ACTIVITIES: CPT

## 2023-08-08 PROCEDURE — 97110 THERAPEUTIC EXERCISES: CPT

## 2023-08-08 PROCEDURE — 97112 NEUROMUSCULAR REEDUCATION: CPT

## 2023-08-08 NOTE — PROGRESS NOTES
PHYSICAL / OCCUPATIONAL THERAPY - DAILY TREATMENT NOTE (updated )    Patient Name: Nonnie Leventhal    Date: 2023    : 1950  Insurance: Payor: MEDICARE / Plan: MEDICARE PART A AND B / Product Type: *No Product type* /      Patient  verified Yes     Visit #   Current / Total 3 16   Time   In / Out 1015 1053   Pain   In / Out 1 0   Subjective Functional Status/Changes: Patient reports he has little to no pain. Reports that he is compliant with HEP. Changes to: Allergies, Med Hx, Sx Hx?   no       TREATMENT AREA =  Pain in left shoulder [M25.512]    OBJECTIVE    Therapeutic Procedures: Tx Min Billable or 1:1 Min (if diff from Tx Min) Procedure, Rationale, Specifics   20  42288 Therapeutic Exercise (timed):  increase ROM, strength, coordination, balance, and proprioception to improve patient's ability to progress to PLOF and address remaining functional goals. (see flow sheet as applicable)     Details if applicable:       10  47111 Therapeutic Activity (timed):  use of dynamic activities replicating functional movements to increase ROM, strength, coordination, balance, and proprioception in order to improve patient's ability to progress to PLOF and address remaining functional goals. (see flow sheet as applicable)     Details if applicable:     8  00630 Neuromuscular Re-Education (timed):  improve balance, coordination, kinesthetic sense, posture, core stability and proprioception to improve patient's ability to develop conscious control of individual muscles and awareness of position of extremities in order to progress to PLOF and address remaining functional goals.  (see flow sheet as applicable)     Details if applicable:     45  [facility] Totals Reminder: bill using total billable min of TIMED therapeutic procedures (example: do not include dry needle or estim unattended, both untimed codes, in totals to left)  8-22 min = 1 unit; 23-37 min = 2 units; 38-52 min = 3 units; 53-67 min = 4 units;

## 2023-08-10 ENCOUNTER — HOSPITAL ENCOUNTER (OUTPATIENT)
Facility: HOSPITAL | Age: 73
Setting detail: RECURRING SERIES
Discharge: HOME | End: 2023-08-13
Payer: MEDICARE

## 2023-08-10 PROCEDURE — 97112 NEUROMUSCULAR REEDUCATION: CPT

## 2023-08-10 PROCEDURE — 97110 THERAPEUTIC EXERCISES: CPT

## 2023-08-10 PROCEDURE — 97530 THERAPEUTIC ACTIVITIES: CPT

## 2023-08-10 NOTE — PROGRESS NOTES
PHYSICAL / OCCUPATIONAL THERAPY - DAILY TREATMENT NOTE (updated )    Patient Name: Jimenez Bravo    Date: 8/10/2023    : 1950  Insurance: Payor: MEDICARE / Plan: MEDICARE PART A AND B / Product Type: *No Product type* /      Patient  verified Yes     Visit #   Current / Total 4 16   Time   In / Out 1057 1138   Pain   In / Out 1 0   Subjective Functional Status/Changes: Patient reports no new changes since last visit. Changes to: Allergies, Med Hx, Sx Hx?   no       TREATMENT AREA =  Pain in left shoulder [M25.512]    OBJECTIVE    Therapeutic Procedures: Tx Min Billable or 1:1 Min (if diff from Tx Min) Procedure, Rationale, Specifics   20  68116 Therapeutic Exercise (timed):  increase ROM, strength, coordination, balance, and proprioception to improve patient's ability to progress to PLOF and address remaining functional goals. (see flow sheet as applicable)     Details if applicable:       10 10 60045 Therapeutic Activity (timed):  use of dynamic activities replicating functional movements to increase ROM, strength, coordination, balance, and proprioception in order to improve patient's ability to progress to PLOF and address remaining functional goals. (see flow sheet as applicable)     Details if applicable:     11 8 84454 Neuromuscular Re-Education (timed):  improve balance, coordination, kinesthetic sense, posture, core stability and proprioception to improve patient's ability to develop conscious control of individual muscles and awareness of position of extremities in order to progress to PLOF and address remaining functional goals.  (see flow sheet as applicable)     Details if applicable:     41 45 [facility] Totals Reminder: bill using total billable min of TIMED therapeutic procedures (example: do not include dry needle or estim unattended, both untimed codes, in totals to left)  8-22 min = 1 unit; 23-37 min = 2 units; 38-52 min = 3 units; 53-67 min = 4 units; 68-82 min = 5 units

## 2023-08-15 ENCOUNTER — HOSPITAL ENCOUNTER (OUTPATIENT)
Facility: HOSPITAL | Age: 73
Setting detail: RECURRING SERIES
Discharge: HOME | End: 2023-08-18
Payer: MEDICARE

## 2023-08-15 PROCEDURE — 97112 NEUROMUSCULAR REEDUCATION: CPT

## 2023-08-15 PROCEDURE — 97110 THERAPEUTIC EXERCISES: CPT

## 2023-08-15 PROCEDURE — 97530 THERAPEUTIC ACTIVITIES: CPT

## 2023-08-15 NOTE — PROGRESS NOTES
PHYSICAL / OCCUPATIONAL THERAPY - DAILY TREATMENT NOTE (updated )    Patient Name: Shira Grover    Date: 8/15/2023    : 1950  Insurance: Payor: MEDICARE / Plan: MEDICARE PART A AND B / Product Type: *No Product type* /      Patient  verified Yes     Visit #   Current / Total 5 16   Time   In / Out 1128 1208   Pain   In / Out 0 0   Subjective Functional Status/Changes: \"The pain is definitely coming down in intensity. It is not as sore after I do things. \"   Changes to:  Meds, Allergies, Med Hx, Sx Hx? If yes, update Summary List no       TREATMENT AREA =  Pain in left shoulder [M25.512]    OBJECTIVE    Therapeutic Procedures: Tx Min Billable or 1:1 Min (if diff from Tx Min) Procedure, Rationale, Specifics   15  71283 Therapeutic Exercise (timed):  increase ROM, strength, coordination, balance, and proprioception to improve patient's ability to progress to PLOF and address remaining functional goals. (see flow sheet as applicable)     Details if applicable:       15  33602 Therapeutic Activity (timed):  use of dynamic activities replicating functional movements to increase ROM, strength, coordination, balance, and proprioception in order to improve patient's ability to progress to PLOF and address remaining functional goals. (see flow sheet as applicable)     Details if applicable:     10  59617 Neuromuscular Re-Education (timed):  improve balance, coordination, kinesthetic sense, posture, core stability and proprioception to improve patient's ability to develop conscious control of individual muscles and awareness of position of extremities in order to progress to PLOF and address remaining functional goals.  (see flow sheet as applicable)     Details if applicable:     36  [facility] Totals Reminder: bill using total billable min of TIMED therapeutic procedures (example: do not include dry needle or estim unattended, both untimed codes, in totals to left)  8-22 min = 1 unit; 23-37 min = 2 units;

## 2023-08-17 ENCOUNTER — HOSPITAL ENCOUNTER (OUTPATIENT)
Facility: HOSPITAL | Age: 73
Setting detail: RECURRING SERIES
Discharge: HOME | End: 2023-08-20
Payer: MEDICARE

## 2023-08-17 PROCEDURE — 97530 THERAPEUTIC ACTIVITIES: CPT

## 2023-08-17 PROCEDURE — 97112 NEUROMUSCULAR REEDUCATION: CPT

## 2023-08-17 PROCEDURE — 97110 THERAPEUTIC EXERCISES: CPT

## 2023-08-17 NOTE — PROGRESS NOTES
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 Reports most pain with pushing and pulling motions, especially out to the side, such as opening and closing car door. Changes to: Allergies, Med Hx, Sx Hx?   no       TREATMENT AREA =  Pain in left shoulder [M25.512]    OBJECTIVE    Therapeutic Procedures: Tx Min Billable or 1:1 Min (if diff from Tx Min) Procedure, Rationale, Specifics   20  90956 Therapeutic Exercise (timed):  increase ROM, strength, coordination, balance, and proprioception to improve patient's ability to progress to PLOF and address remaining functional goals. (see flow sheet as applicable)     Details if applicable:       9  07895 Neuromuscular Re-Education (timed):  improve balance, coordination, kinesthetic sense, posture, core stability and proprioception to improve patient's ability to develop conscious control of individual muscles and awareness of position of extremities in order to progress to PLOF and address remaining functional goals. (see flow sheet as applicable)     Details if applicable:     9  98774 Therapeutic Activity (timed):  use of dynamic activities replicating functional movements to increase ROM, strength, coordination, balance, and proprioception in order to improve patient's ability to progress to PLOF and address remaining functional goals.   (see flow sheet as applicable)     Details if applicable:            Details if applicable:            Details if applicable:     45  [facility]

## 2023-08-22 ENCOUNTER — APPOINTMENT (OUTPATIENT)
Facility: HOSPITAL | Age: 73
End: 2023-08-22
Payer: MEDICARE

## 2023-08-24 ENCOUNTER — HOSPITAL ENCOUNTER (OUTPATIENT)
Facility: HOSPITAL | Age: 73
Setting detail: RECURRING SERIES
Discharge: HOME | End: 2023-08-27
Payer: MEDICARE

## 2023-08-24 PROCEDURE — 97110 THERAPEUTIC EXERCISES: CPT

## 2023-08-24 PROCEDURE — 97530 THERAPEUTIC ACTIVITIES: CPT

## 2023-08-24 PROCEDURE — 97112 NEUROMUSCULAR REEDUCATION: CPT

## 2023-08-24 NOTE — PROGRESS NOTES
greater than 1-2/10 with overhead activities to aid in completion of ADLs. Status at IE: 0-10                 Current: In-progress, 0-4/10, 8/15/2023                    Patient will increase left UE strength to 4+/5 throughout all planes to aid in completion of ADLs. Status at IE: 4-/5                 Current: In-progress, 4/5, 8/24/2023                    Patient will increase FOTO score to 68 points overall to demonstrate improvement in functional status.                   Status at IE: FOTO score = 53 (an established functional score where 100 = no disability)                 Current: 8/17/23: 59 progressing    PLAN  Yes  Continue plan of care  []  Upgrade activities as tolerated  []  Discharge due to :  []  Other:    Allyson Woo PT    8/24/2023    11:53 AM    Future Appointments   Date Time Provider [address]   8/29/2023 11:30 AM SALTY Taylor [facility]   8/31/2023  9:30 AM SALTY Taylor [facility]   9/5/2023 11:30 AM Aly Woodard [facility]

## 2023-08-29 ENCOUNTER — HOSPITAL ENCOUNTER (OUTPATIENT)
Facility: HOSPITAL | Age: 73
Setting detail: RECURRING SERIES
Discharge: HOME | End: 2023-09-01
Payer: MEDICARE

## 2023-08-29 PROCEDURE — 97110 THERAPEUTIC EXERCISES: CPT

## 2023-08-29 PROCEDURE — 97530 THERAPEUTIC ACTIVITIES: CPT

## 2023-08-29 NOTE — PROGRESS NOTES
PHYSICAL / OCCUPATIONAL THERAPY - DAILY TREATMENT NOTE (updated )    Patient Name: Maggi Mas    Date: 2023    : 1950  Insurance: Payor: MEDICARE / Plan: MEDICARE PART A AND B / Product Type: *No Product type* /      Patient  verified Yes     Visit #   Current / Total 8 16   Time   In / Out 1131 1201   Pain   In / Out 0 0   Subjective Functional Status/Changes: Patient reports that he is doing well. States that he is ready to manage his condition independently. Changes to:  Meds, Allergies, Med Hx, Sx Hx? If yes, update Summary List no       TREATMENT AREA =  Pain in left shoulder [M25.512]    OBJECTIVE    Therapeutic Procedures: Tx Min Billable or 1:1 Min (if diff from Tx Min) Procedure, Rationale, Specifics   20  88901 Therapeutic Exercise (timed):  increase ROM, strength, coordination, balance, and proprioception to improve patient's ability to progress to PLOF and address remaining functional goals. (see flow sheet as applicable)     Details if applicable:       10  28099 Therapeutic Activity (timed):  use of dynamic activities replicating functional movements to increase ROM, strength, coordination, balance, and proprioception in order to improve patient's ability to progress to PLOF and address remaining functional goals.   (see flow sheet as applicable)     Details if applicable:     27  [facility] Totals Reminder: bill using total billable min of TIMED therapeutic procedures (example: do not include dry needle or estim unattended, both untimed codes, in totals to left)  8-22 min = 1 unit; 23-37 min = 2 units; 38-52 min = 3 units; 53-67 min = 4 units; 68-82 min = 5 units   Total Total       TOTAL TREATMENT TIME:        30       [x]  Patient Education billed concurrently with other procedures   [x] Review HEP    [] Progressed/Changed HEP, detail:    [] Other detail:       Objective Information/Functional Measures/Assessment  Patient is being discharged as he has met 100% of his short

## 2023-08-29 NOTE — PROGRESS NOTES
[facility] at [facility], [address]  Phone: [phone]   Fax: [phone]    Discharge Summary    Patient name: Marla Chatman     Start of Care: 2023  Referral source: Raman Yadav    : 1950  Medical/Treatment Diagnosis: Pain in left shoulder [M25.512]  Onset Date:DOS 2023       Prior Hospitalization: see medical history   Provider#: [id number]  Medications: Verified on Patient Summary List    Comorbidities: TBI, history of vertigo, high BP  Prior Level of Function: independent with all activities, No HX of falls, construction and yard works    Visits from [facility]: 8       Reporting Period : 2023 to 2023    Goals/Measure of Progress:  Short Term Goals: To be accomplished in 4 weeks:                 Patient will report compliance with HEP at least 1x/day to aid in rehabilitation program.                 Status at IE: provided initial HEP                 Current: Met,                     Patient will display left shoulder flexion pain free AROM into 140 degrees to aid in completion of ADLs. Status at IE: 100 degrees with 2/10 pain                 Current:  Met, 163 degrees, 2023     Long Term Goals: To be accomplished in 8 weeks:                 Patient will report compliance with HEP a least 3-4x/week to aid in rehabilitation/strengthening program.                 Status at IE: NA                 Current:Met, 2023                    Patient will report no pain greater than 1-2/10 with overhead activities to aid in completion of ADLs. Status at IE: 0-10                 Current: Met, 0-1/10, 2023                    Patient will increase left UE strength to 4+/5 throughout all planes to aid in completion of ADLs.                  Status at IE: 4-/5                 Current: Met, 4+/5, 2023                    Patient will increase FOTO score to 68 points overall to

## 2023-08-29 NOTE — PROGRESS NOTES
Physical Therapy Discharge Instructions    [facility] at [address] [facility] [address] [facility],[address]  Phone: [phone]   Fax: [phone]      Patient: Akanksha Christianson  : 1950    Continue Home Exercise Program 3-4 times per week         Follow up with MD:     [] Upon completion of therapy     [x] As needed      Recommendations:     [x]   Return to activity with home program    [x]   Return to activity with the following modifications:       [x]Post Rehab Program    []Join Independent aquatic program     []Return to/join local gym      Additional Comments: Please contact clinic at above phone number if you have any questions regarding Home Exercise Program or self care instructions.

## 2023-08-31 ENCOUNTER — APPOINTMENT (OUTPATIENT)
Facility: HOSPITAL | Age: 73
End: 2023-08-31
Payer: MEDICARE

## (undated) DEVICE — GOWN,SIRUS,NONRNF,SETINSLV,2XL,18/CS: Brand: MEDLINE

## (undated) DEVICE — SYRINGE MED 10ML LUERLOCK TIP W/O SFTY DISP

## (undated) DEVICE — SUTURE MCRYL SZ 3-0 L27IN ABSRB UD PS-2 3/8 CIR REV CUT NDL MCP427H

## (undated) DEVICE — POSITIONER,HEAD,RING CUSHION,9IN,32CS: Brand: MEDLINE

## (undated) DEVICE — Device

## (undated) DEVICE — BIT DRL L165MM DIA2.8MM QUIK CPL W/O STP REUSE

## (undated) DEVICE — SUTURE VCRL + SZ 2-0 L36IN ABSRB UD L36MM CT-1 1/2 CIR VCP945H

## (undated) DEVICE — BANDAGE COBAN 4 IN COMPR W4INXL5YD FOAM COHESIVE QUIK STK SELF ADH SFT

## (undated) DEVICE — DRAPE C ARM UNIV W41XL74IN CLR PLAS XR VELC CLSR POLY STRP

## (undated) DEVICE — BIT DRL L110MM DIA2.5MM G QUIK CPL W/O STP REUSE

## (undated) DEVICE — APPLICATOR MEDICATED 26 CC SOLUTION HI LT ORNG CHLORAPREP

## (undated) DEVICE — OPTIFOAM GENTLE SA, POSTOP, 4X8: Brand: MEDLINE

## (undated) DEVICE — ADHESIVE SKIN CLSR 0.7ML TOP DERMBND ADV

## (undated) DEVICE — GARMENT,MEDLINE,DVT,INT,CALF,MED, GEN2: Brand: MEDLINE

## (undated) DEVICE — PACK PROCEDURE SURG TOT SHLDR CUST

## (undated) DEVICE — HANDPIECE SET WITH HIGH FLOW TIP AND SUCTION TUBE: Brand: INTERPULSE

## (undated) DEVICE — OSCILLATING TIP SAW CARTRIDGE: Brand: PRECISION FALCON

## (undated) DEVICE — ELECTRODE NDL 2.8IN COAT VALLEYLAB